# Patient Record
Sex: MALE | Race: WHITE | Employment: FULL TIME | URBAN - NONMETROPOLITAN AREA
[De-identification: names, ages, dates, MRNs, and addresses within clinical notes are randomized per-mention and may not be internally consistent; named-entity substitution may affect disease eponyms.]

---

## 2017-10-08 ENCOUNTER — HOSPITAL ENCOUNTER (EMERGENCY)
Age: 42
Discharge: HOME | End: 2017-10-08
Attending: FAMILY MEDICINE
Payer: COMMERCIAL

## 2017-10-08 VITALS
DIASTOLIC BLOOD PRESSURE: 78 MMHG | RESPIRATION RATE: 20 BRPM | SYSTOLIC BLOOD PRESSURE: 126 MMHG | OXYGEN SATURATION: 99 % | HEART RATE: 63 BPM | TEMPERATURE: 97.9 F

## 2017-10-08 DIAGNOSIS — T63.441A BEE STING, ACCIDENTAL OR UNINTENTIONAL, INITIAL ENCOUNTER: Primary | ICD-10-CM

## 2017-10-08 PROCEDURE — 6360000002 HC RX W HCPCS: Performed by: FAMILY MEDICINE

## 2017-10-08 PROCEDURE — 99282 EMERGENCY DEPT VISIT SF MDM: CPT

## 2017-10-08 PROCEDURE — 96374 THER/PROPH/DIAG INJ IV PUSH: CPT

## 2017-10-08 RX ORDER — DIPHENHYDRAMINE HYDROCHLORIDE 50 MG/ML
25 INJECTION INTRAMUSCULAR; INTRAVENOUS ONCE
Status: COMPLETED | OUTPATIENT
Start: 2017-10-08 | End: 2017-10-08

## 2017-10-08 RX ADMIN — DIPHENHYDRAMINE HYDROCHLORIDE 25 MG: 50 INJECTION, SOLUTION INTRAMUSCULAR; INTRAVENOUS at 18:23

## 2017-10-08 NOTE — ED PROVIDER NOTES
Allergies   Allergen Reactions    Bee Venom Anaphylaxis    Codeine Shortness Of Breath       FAMILY HISTORY    Family History   Problem Relation Age of Onset    Diabetes Mother     Asthma Mother     Cancer Father      Cancer    Heart Disease Father        SOCIAL HISTORY    Social History     Social History    Marital status:      Spouse name: N/A    Number of children: N/A    Years of education: N/A     Social History Main Topics    Smoking status: Former Smoker     Years: 15.00     Quit date: 3/24/2009    Smokeless tobacco: Current User     Types: Chew    Alcohol use 0.6 oz/week     1 Cans of beer per week      Comment: rare    Drug use: No    Sexual activity: Not Asked     Other Topics Concern    None     Social History Narrative    None       REVIEW OF SYSTEMS    Constitutional:  Denies fever, chills, weight loss or weakness   Eyes:  Denies photophobia or discharge   HENT:  Denies sore throat or ear pain   Respiratory:  Denies cough or shortness of breath   Cardiovascular:  Denies chest pain, palpitations or swelling   GI:  Denies abdominal pain, nausea, vomiting, or diarrhea   Musculoskeletal:  Denies back pain   Skin:  Denies rash   Neurologic:  Denies headache, focal weakness or sensory changes   Endocrine:  Denies polyuria or polydypsia   Lymphatic:  Denies swollen glands   Psychiatric:  Denies depression, suicidal ideation or homicidal ideation   All systems negative except as marked. PHYSICAL EXAM    VITAL SIGNS: /78   Pulse 63   Temp 97.9 °F (36.6 °C) (Oral)   Resp 20   SpO2 99%    Constitutional:  Well developed, Well nourished, Mild acute distress, Non-toxic appearance. Has wash cloth on  forehead  HENT:  Normocephalic, Atraumatic, Bilateral external ears normal,  Oropharynx moist, No oral exudates, Nose normal. Neck- Normal range of motion, No tenderness, Supple, No stridor. Eyes:  PERRL, EOMI, Conjunctiva normal, No discharge.    Respiratory:  Normal breath sounds, No respiratory distress, No wheezing, No chest tenderness. Cardiovascular:  Normal heart rate, Normal rhythm, No murmurs, No rubs, No gallops appreciated. [facility] Malady GI:  Bowel sounds normal, Soft, No tenderness, No masses or hepatosplenomegaly, No pulsatile masses. : No CVA tenderness. Musculoskeletal:   No edema, No tenderness, No cyanosis, No clubbing. Good range of motion in all major joints. No tenderness to palpation or major deformities noted. Back- No tenderness to percussion of spine. Integument:  Warm, Dry, No erythema, No rash. Small area of erythema on the DIP of the left ring finger on the dorsal surface. No swelling and no hives seen over the body area. Lymphatic:  No lymphadenopathy noted. Neurologic:  Alert & oriented x 3, Normal motor function, Normal sensory function, No focal deficits noted. Cranial nerves 2-12 wnl and symmetrical.   Psychiatric:  Affect normal, Judgment normal, Mood normal.     EKG        RADIOLOGY    No results found. PROCEDURES        Labs  Labs Reviewed - No data to display          Summation      Patient Course: 49-year-old male who was stung on the ring finger left hand by a bee. He has an ALLERGIC history to bee stings. He has no swelling or hives or rash. He has no wheezing and no pharyngeal swelling. He was treated with IV Benadryl and observed for an hour. He had no orthostatic changes and was able to walk in the schneider. He is discharged home to follow up with primary care and he is given a list of primary care doctors    ED Medications administered this visit:    Medications   diphenhydrAMINE (BENADRYL) injection 25 mg (25 mg Intravenous Given 10/8/17 1823)       New Prescriptions from this visit:    New Prescriptions    No medications on file       Follow-up:  No follow-up provider specified. Final Impression:   1.  Bee sting, accidental or unintentional, initial encounter               (Please note that portions of this note were completed

## 2017-10-09 NOTE — ED NOTES
Pt.'s left hand elevated and ice pack applied to left hand. No visible interruptions in skin integrity, no redness swelling to left hand. CMS intact.      Sandeep Frey RN  10/08/17 2597

## 2018-01-02 ENCOUNTER — HOSPITAL ENCOUNTER (EMERGENCY)
Age: 43
Discharge: HOME | End: 2018-01-03
Attending: FAMILY MEDICINE
Payer: COMMERCIAL

## 2018-01-02 VITALS
RESPIRATION RATE: 16 BRPM | DIASTOLIC BLOOD PRESSURE: 88 MMHG | OXYGEN SATURATION: 98 % | TEMPERATURE: 98.9 F | HEART RATE: 77 BPM | SYSTOLIC BLOOD PRESSURE: 138 MMHG

## 2018-01-02 DIAGNOSIS — K62.5 RECTAL BLEEDING: Primary | ICD-10-CM

## 2018-01-02 PROCEDURE — 99284 EMERGENCY DEPT VISIT MOD MDM: CPT

## 2018-01-02 ASSESSMENT — PAIN DESCRIPTION - PAIN TYPE: TYPE: ACUTE PAIN

## 2018-01-02 ASSESSMENT — PAIN DESCRIPTION - DESCRIPTORS: DESCRIPTORS: DISCOMFORT;PRESSURE

## 2018-01-02 ASSESSMENT — PAIN DESCRIPTION - LOCATION: LOCATION: ABDOMEN

## 2018-01-02 ASSESSMENT — PAIN DESCRIPTION - FREQUENCY: FREQUENCY: INTERMITTENT

## 2018-01-02 ASSESSMENT — PAIN SCALES - GENERAL: PAINLEVEL_OUTOF10: 2

## 2018-01-03 ENCOUNTER — APPOINTMENT (OUTPATIENT)
Dept: CT IMAGING | Age: 43
End: 2018-01-03
Payer: COMMERCIAL

## 2018-01-03 LAB
ABSOLUTE EOS #: 0.1 K/UL (ref 0–0.4)
ABSOLUTE IMMATURE GRANULOCYTE: ABNORMAL K/UL (ref 0–0.3)
ABSOLUTE LYMPH #: 1.8 K/UL (ref 1–4.8)
ABSOLUTE MONO #: 0.6 K/UL (ref 0–1)
ABSOLUTE RETIC #: 0.07 M/UL (ref 0.02–0.12)
ANION GAP SERPL CALCULATED.3IONS-SCNC: 13 MMOL/L (ref 9–17)
BASOPHILS # BLD: 1 % (ref 0–2)
BASOPHILS ABSOLUTE: 0 K/UL (ref 0–0.2)
BUN BLDV-MCNC: 15 MG/DL (ref 6–20)
BUN/CREAT BLD: 18 (ref 9–20)
CALCIUM SERPL-MCNC: 9.4 MG/DL (ref 8.6–10.4)
CHLORIDE BLD-SCNC: 101 MMOL/L (ref 98–107)
CO2: 26 MMOL/L (ref 20–31)
CREAT SERPL-MCNC: 0.82 MG/DL (ref 0.7–1.2)
DIFFERENTIAL TYPE: YES
EOSINOPHILS RELATIVE PERCENT: 2 % (ref 0–5)
GFR AFRICAN AMERICAN: >60 ML/MIN
GFR NON-AFRICAN AMERICAN: >60 ML/MIN
GFR SERPL CREATININE-BSD FRML MDRD: ABNORMAL ML/MIN/{1.73_M2}
GFR SERPL CREATININE-BSD FRML MDRD: ABNORMAL ML/MIN/{1.73_M2}
GLUCOSE BLD-MCNC: 125 MG/DL (ref 70–99)
HCT VFR BLD CALC: 41.7 % (ref 41–53)
HEMOGLOBIN: 14.4 G/DL (ref 13.5–17.5)
IMMATURE GRANULOCYTES: ABNORMAL %
IMMATURE RETIC FRACT: NORMAL %
IRON SATURATION: 18 % (ref 20–55)
IRON: 58 UG/DL (ref 59–158)
LYMPHOCYTES # BLD: 30 % (ref 13–44)
MCH RBC QN AUTO: 30.1 PG (ref 26–34)
MCHC RBC AUTO-ENTMCNC: 34.4 G/DL (ref 31–37)
MCV RBC AUTO: 87.4 FL (ref 80–100)
MONOCYTES # BLD: 10 % (ref 5–9)
PDW BLD-RTO: 14 % (ref 12.1–15.2)
PLATELET # BLD: 236 K/UL (ref 140–450)
PLATELET ESTIMATE: ABNORMAL
PMV BLD AUTO: ABNORMAL FL (ref 6–12)
POTASSIUM SERPL-SCNC: 3.6 MMOL/L (ref 3.7–5.3)
RBC # BLD: 4.77 M/UL (ref 4.5–5.9)
RBC # BLD: ABNORMAL 10*6/UL
RETIC %: 1.5 % (ref 0.5–2)
RETIC HEMOGLOBIN: NORMAL PG (ref 28.2–35.7)
SEG NEUTROPHILS: 57 % (ref 39–75)
SEGMENTED NEUTROPHILS ABSOLUTE COUNT: 3.6 K/UL (ref 2.1–6.5)
SODIUM BLD-SCNC: 140 MMOL/L (ref 135–144)
TOTAL IRON BINDING CAPACITY: 322 UG/DL (ref 250–450)
UNSATURATED IRON BINDING CAPACITY: 264 UG/DL (ref 112–347)
WBC # BLD: 6.2 K/UL (ref 3.5–11)
WBC # BLD: ABNORMAL 10*3/UL

## 2018-01-03 PROCEDURE — 6360000004 HC RX CONTRAST MEDICATION: Performed by: FAMILY MEDICINE

## 2018-01-03 PROCEDURE — 2580000003 HC RX 258: Performed by: FAMILY MEDICINE

## 2018-01-03 PROCEDURE — 36415 COLL VENOUS BLD VENIPUNCTURE: CPT

## 2018-01-03 PROCEDURE — 80048 BASIC METABOLIC PNL TOTAL CA: CPT

## 2018-01-03 PROCEDURE — 85025 COMPLETE CBC W/AUTO DIFF WBC: CPT

## 2018-01-03 PROCEDURE — 74177 CT ABD & PELVIS W/CONTRAST: CPT

## 2018-01-03 PROCEDURE — 83550 IRON BINDING TEST: CPT

## 2018-01-03 PROCEDURE — 85045 AUTOMATED RETICULOCYTE COUNT: CPT

## 2018-01-03 PROCEDURE — 83540 ASSAY OF IRON: CPT

## 2018-01-03 RX ORDER — SODIUM CHLORIDE 9 MG/ML
150 INJECTION, SOLUTION INTRAVENOUS CONTINUOUS
Status: DISCONTINUED | OUTPATIENT
Start: 2018-01-03 | End: 2018-01-03 | Stop reason: HOSPADM

## 2018-01-03 RX ORDER — FERROUS GLUCONATE 324(37.5)
324 TABLET ORAL 2 TIMES DAILY
Qty: 60 TABLET | Refills: 0 | Status: SHIPPED | OUTPATIENT
Start: 2018-01-03 | End: 2019-12-23

## 2018-01-03 RX ADMIN — IOVERSOL 75 ML: 741 INJECTION INTRA-ARTERIAL; INTRAVENOUS at 00:47

## 2018-01-03 RX ADMIN — SODIUM CHLORIDE 150 ML/HR: 9 INJECTION, SOLUTION INTRAVENOUS at 01:02

## 2018-01-03 NOTE — ED PROVIDER NOTES
History     Social History    Marital status:      Spouse name: N/A    Number of children: N/A    Years of education: N/A     Social History Main Topics    Smoking status: Former Smoker     Years: 15.00     Quit date: 3/24/2009    Smokeless tobacco: Current User     Types: Chew    Alcohol use 0.6 oz/week     1 Cans of beer per week      Comment: rare    Drug use: No    Sexual activity: Not Asked     Other Topics Concern    None     Social History Narrative    None       REVIEW OF SYSTEMS    Constitutional:  Denies fever, chills, weight loss or weakness   Eyes:  Denies photophobia or discharge   HENT:  Denies sore throat or ear pain   Respiratory:  Denies cough or shortness of breath   Cardiovascular:  Denies chest pain, palpitations or swelling   GI:  Denies abdominal pain, nausea, vomiting, or diarrhea   Musculoskeletal:  Denies back pain   Skin:  Denies rash   Neurologic:  Denies headache, focal weakness or sensory changes   Endocrine:  Denies polyuria or polydypsia   Lymphatic:  Denies swollen glands   Psychiatric:  Denies depression, suicidal ideation or homicidal ideation   All systems negative except as marked. PHYSICAL EXAM    VITAL SIGNS: /88   Pulse 77   Temp 98.9 °F (37.2 °C) (Oral)   Resp 16   SpO2 98%    Constitutional:  Well developed, Well nourished, No acute distress, Non-toxic appearance. HENT:  Normocephalic, Atraumatic, Bilateral external ears normal, TM's normal, Oropharynx moist, No oral exudates, Nose normal. Neck- Normal range of motion, No tenderness, Supple, No stridor. Eyes:  PERRL, EOMI, Conjunctiva normal, No discharge. Respiratory:  Normal breath sounds, No respiratory distress, No wheezing, No chest tenderness. Cardiovascular:  Normal heart rate, Normal rhythm, No murmurs, No rubs, No gallops appreciated. [facility] Ripper GI:  Bowel sounds normal, Soft,mild generalized tenderness, No masses or hepatosplenomegaly, No pulsatile masses. : No CVA tenderness.

## 2018-01-04 ENCOUNTER — INITIAL CONSULT (OUTPATIENT)
Dept: SURGERY | Age: 43
End: 2018-01-04
Payer: COMMERCIAL

## 2018-01-04 VITALS — HEIGHT: 75 IN | BODY MASS INDEX: 23.85 KG/M2 | WEIGHT: 191.8 LBS | RESPIRATION RATE: 18 BRPM

## 2018-01-04 DIAGNOSIS — K62.5 RECTAL BLEEDING: Primary | ICD-10-CM

## 2018-01-04 DIAGNOSIS — R10.12 LUQ ABDOMINAL PAIN: ICD-10-CM

## 2018-01-04 DIAGNOSIS — Z01.818 PREOP EXAMINATION: ICD-10-CM

## 2018-01-04 PROCEDURE — 99214 OFFICE O/P EST MOD 30 MIN: CPT | Performed by: SURGERY

## 2018-01-04 PROCEDURE — 4004F PT TOBACCO SCREEN RCVD TLK: CPT | Performed by: SURGERY

## 2018-01-04 PROCEDURE — G8484 FLU IMMUNIZE NO ADMIN: HCPCS | Performed by: SURGERY

## 2018-01-04 PROCEDURE — G8420 CALC BMI NORM PARAMETERS: HCPCS | Performed by: SURGERY

## 2018-01-04 PROCEDURE — G8427 DOCREV CUR MEDS BY ELIG CLIN: HCPCS | Performed by: SURGERY

## 2018-01-04 RX ORDER — SODIUM, POTASSIUM,MAG SULFATES 17.5-3.13G
1 SOLUTION, RECONSTITUTED, ORAL ORAL ONCE
Qty: 2 BOTTLE | Refills: 0 | Status: SHIPPED | OUTPATIENT
Start: 2018-01-04 | End: 2018-01-04

## 2018-01-29 ASSESSMENT — ENCOUNTER SYMPTOMS
CONSTIPATION: 1
BLOOD IN STOOL: 1
ABDOMINAL DISTENTION: 1
SORE THROAT: 0
BACK PAIN: 0
TROUBLE SWALLOWING: 0
VOMITING: 0
CHOKING: 0
ABDOMINAL PAIN: 1
COUGH: 0
NAUSEA: 0
SHORTNESS OF BREATH: 1

## 2018-01-29 NOTE — PROGRESS NOTES
Subjective:      Patient ID: Yohana Castellanos is a 38 y.o. male. HPI     Mr Benny Grimaldo returns for reevaluation. He is a 39 yo WM whom I first saw in 2015 for epigastric pain. EGD at that time showed mild duodenitis. He had a previous history of gastritis, duodenal ulcer, and hiatal hernia with esophagitis. Biopsies of GE junction did not show Davis's change. H pylori negative. Upper GI series August 2015 shows reflux to mid esophagus. His GERD symptoms were reasonably controlled with Protonix, but have worsened recently. He quit smoking 2009, but uses chewing tobacco daily. .. On can lasts 2 days. Rare use of EtOH. Denies drugs. No longer taking indomethacin. Limited carbonated beverages and caffeine. Occasionally enjoys spicy foods. Evaluated in [facility] ER Jan 2, 2018 for rectal bleeding and LLQ pain. He was treated and released, Rx for ferrous gluconate. Review of Systems   Constitutional: Positive for fatigue. Negative for activity change, appetite change, chills, fever and unexpected weight change. HENT: Negative for nosebleeds, sneezing, sore throat and trouble swallowing. Eyes: Negative for visual disturbance. Respiratory: Positive for shortness of breath. Negative for cough and choking. Cardiovascular: Negative for chest pain, palpitations and leg swelling. Gastrointestinal: Positive for abdominal distention, abdominal pain, blood in stool and constipation. Negative for nausea and vomiting. Genitourinary: Negative for dysuria, flank pain and hematuria. Musculoskeletal: Positive for arthralgias. Negative for back pain, gait problem and myalgias. Allergic/Immunologic: Negative for immunocompromised state. Neurological: Positive for headaches. Negative for dizziness, seizures, syncope and weakness. Hematological: Does not bruise/bleed easily. Psychiatric/Behavioral: Negative for confusion and sleep disturbance.         Past Medical History:   Diagnosis Date    COPD [facility]ch[facility] obstructive [facility] disease) ([facility] [address]     Former smoker     H/O hiatal hernia     Hypertension     PUD (peptic ulcer disease) 2/17/2014       Past Surgical History:   Procedure Laterality Date    CARDIAC CATHETERIZATION  3/6/14    Dr Azucena Dorantes: 1. Normal left ventricular systolic function, estimated ejectin fraction of 50 to 52%. 2. Normal left ventricular end-diastolic pressure. 3. Normal angiographically appearing epicardial coronary artery disease, incidental note is made of a small myocardial bridging segment within the left anterior descending and distal anterior circulation vessel attenuation.  CHOLECYSTECTOMY      DENTAL SURGERY      ENDOSCOPY, COLON, DIAGNOSTIC  2014 & 7/20/15    HIATAL HERNIA REPAIR  06/09/2016    UPPER GASTROINTESTINAL ENDOSCOPY  03-    Dr. Marti Badillo ENDOSCOPY  10/09/14    mild gastritis and duodenitis, Random biopsies of GE junction    UPPER GASTROINTESTINAL ENDOSCOPY  07-       Family History   Problem Relation Age of Onset    Diabetes Mother     Asthma Mother     Cancer Father      Cancer    Heart Disease Father        Allergies:  See list    Current Outpatient Prescriptions   Medication Sig Dispense Refill    ferrous gluconate 324 (37.5 Fe) MG TABS Take 1 tablet by mouth 2 times daily 60 tablet 0     No current facility-administered medications for this visit.         Social History     Social History    Marital status:      Spouse name: N/A    Number of children: N/A    Years of education: N/A     Social History Main Topics    Smoking status: Former Smoker     Years: 15.00     Quit date: 3/24/2009    Smokeless tobacco: Current User     Types: Chew    Alcohol use 0.6 oz/week     1 Cans of beer per week      Comment: rare    Drug use: No    Sexual activity: Not Asked     Other Topics Concern    None     Social History Narrative    None       Objective:   Physical Exam   Constitutional: He is oriented to person, place,

## 2019-12-23 ENCOUNTER — HOSPITAL ENCOUNTER (EMERGENCY)
Age: 44
Discharge: HOME | End: 2019-12-23
Attending: EMERGENCY MEDICINE

## 2019-12-23 VITALS
OXYGEN SATURATION: 94 % | WEIGHT: 193.9 LBS | HEIGHT: 75 IN | DIASTOLIC BLOOD PRESSURE: 78 MMHG | TEMPERATURE: 100.5 F | SYSTOLIC BLOOD PRESSURE: 129 MMHG | HEART RATE: 102 BPM | BODY MASS INDEX: 24.11 KG/M2 | RESPIRATION RATE: 16 BRPM

## 2019-12-23 DIAGNOSIS — J10.1 INFLUENZA A: Primary | ICD-10-CM

## 2019-12-23 LAB
DIRECT EXAM: ABNORMAL
DIRECT EXAM: ABNORMAL
Lab: ABNORMAL
SPECIMEN DESCRIPTION: ABNORMAL

## 2019-12-23 PROCEDURE — 87804 INFLUENZA ASSAY W/OPTIC: CPT

## 2019-12-23 PROCEDURE — 99283 EMERGENCY DEPT VISIT LOW MDM: CPT

## 2019-12-23 RX ORDER — BROMPHENIRAMINE MALEATE, PSEUDOEPHEDRINE HYDROCHLORIDE, AND DEXTROMETHORPHAN HYDROBROMIDE 2; 30; 10 MG/5ML; MG/5ML; MG/5ML
5 SYRUP ORAL 4 TIMES DAILY PRN
Qty: 100 ML | Refills: 0 | Status: SHIPPED | OUTPATIENT
Start: 2019-12-23 | End: 2019-12-28

## 2019-12-23 RX ORDER — OSELTAMIVIR PHOSPHATE 75 MG/1
75 CAPSULE ORAL 2 TIMES DAILY
Qty: 10 CAPSULE | Refills: 0 | Status: SHIPPED | OUTPATIENT
Start: 2019-12-23 | End: 2019-12-28

## 2019-12-23 ASSESSMENT — PAIN DESCRIPTION - LOCATION: LOCATION: GENERALIZED

## 2019-12-23 ASSESSMENT — PAIN SCALES - GENERAL: PAINLEVEL_OUTOF10: 3

## 2019-12-23 ASSESSMENT — PAIN DESCRIPTION - PAIN TYPE: TYPE: ACUTE PAIN

## 2019-12-23 ASSESSMENT — PAIN DESCRIPTION - DESCRIPTORS: DESCRIPTORS: ACHING

## 2019-12-23 ASSESSMENT — PAIN DESCRIPTION - FREQUENCY: FREQUENCY: CONTINUOUS

## 2020-03-13 ENCOUNTER — HOSPITAL ENCOUNTER (EMERGENCY)
Age: 45
Discharge: HOME | End: 2020-03-13
Attending: EMERGENCY MEDICINE
Payer: COMMERCIAL

## 2020-03-13 ENCOUNTER — APPOINTMENT (OUTPATIENT)
Dept: GENERAL RADIOLOGY | Age: 45
End: 2020-03-13
Payer: COMMERCIAL

## 2020-03-13 VITALS
WEIGHT: 190 LBS | DIASTOLIC BLOOD PRESSURE: 87 MMHG | TEMPERATURE: 97.8 F | HEART RATE: 62 BPM | OXYGEN SATURATION: 100 % | SYSTOLIC BLOOD PRESSURE: 131 MMHG | HEIGHT: 75 IN | BODY MASS INDEX: 23.62 KG/M2 | RESPIRATION RATE: 17 BRPM

## 2020-03-13 LAB
ABSOLUTE EOS #: 0.1 K/UL (ref 0–0.4)
ABSOLUTE IMMATURE GRANULOCYTE: NORMAL K/UL (ref 0–0.3)
ABSOLUTE LYMPH #: 1.5 K/UL (ref 1–4.8)
ABSOLUTE MONO #: 0.5 K/UL (ref 0–1)
ANION GAP SERPL CALCULATED.3IONS-SCNC: 10 MMOL/L (ref 9–17)
BASOPHILS # BLD: 1 % (ref 0–2)
BASOPHILS ABSOLUTE: 0 K/UL (ref 0–0.2)
BUN BLDV-MCNC: 18 MG/DL (ref 6–20)
BUN/CREAT BLD: 15 (ref 9–20)
CALCIUM SERPL-MCNC: 9.9 MG/DL (ref 8.6–10.4)
CHLORIDE BLD-SCNC: 102 MMOL/L (ref 98–107)
CO2: 27 MMOL/L (ref 20–31)
CREAT SERPL-MCNC: 1.22 MG/DL (ref 0.7–1.2)
DIFFERENTIAL TYPE: YES
DIRECT EXAM: NORMAL
DIRECT EXAM: NORMAL
EKG ATRIAL RATE: 71 BPM
EKG P AXIS: 48 DEGREES
EKG P-R INTERVAL: 128 MS
EKG Q-T INTERVAL: 404 MS
EKG QRS DURATION: 104 MS
EKG QTC CALCULATION (BAZETT): 439 MS
EKG R AXIS: 19 DEGREES
EKG T AXIS: 26 DEGREES
EKG VENTRICULAR RATE: 71 BPM
EOSINOPHILS RELATIVE PERCENT: 1 % (ref 0–5)
GFR AFRICAN AMERICAN: >60 ML/MIN
GFR NON-AFRICAN AMERICAN: >60 ML/MIN
GFR SERPL CREATININE-BSD FRML MDRD: ABNORMAL ML/MIN/{1.73_M2}
GFR SERPL CREATININE-BSD FRML MDRD: ABNORMAL ML/MIN/{1.73_M2}
GLUCOSE BLD-MCNC: 112 MG/DL (ref 70–99)
HCT VFR BLD CALC: 41.5 % (ref 41–53)
HEMOGLOBIN: 14.2 G/DL (ref 13.5–17.5)
IMMATURE GRANULOCYTES: NORMAL %
INR BLD: 1
LYMPHOCYTES # BLD: 24 % (ref 13–44)
Lab: NORMAL
MCH RBC QN AUTO: 30.3 PG (ref 26–34)
MCHC RBC AUTO-ENTMCNC: 34.3 G/DL (ref 31–37)
MCV RBC AUTO: 88.4 FL (ref 80–100)
MONOCYTES # BLD: 7 % (ref 5–9)
NRBC AUTOMATED: NORMAL PER 100 WBC
PARTIAL THROMBOPLASTIN TIME: 25.1 SEC (ref 21–33)
PDW BLD-RTO: 14.7 % (ref 12.1–15.2)
PLATELET # BLD: 234 K/UL (ref 140–450)
PLATELET ESTIMATE: NORMAL
PMV BLD AUTO: NORMAL FL (ref 6–12)
POTASSIUM SERPL-SCNC: 3.8 MMOL/L (ref 3.7–5.3)
PROTHROMBIN TIME: 9.7 SEC (ref 9–11.6)
RBC # BLD: 4.69 M/UL (ref 4.5–5.9)
RBC # BLD: NORMAL 10*6/UL
SEG NEUTROPHILS: 67 % (ref 39–75)
SEGMENTED NEUTROPHILS ABSOLUTE COUNT: 4.3 K/UL (ref 2.1–6.5)
SODIUM BLD-SCNC: 139 MMOL/L (ref 135–144)
SPECIMEN DESCRIPTION: NORMAL
TROPONIN INTERP: NORMAL
TROPONIN T: <0.03 NG/ML
TROPONIN, HIGH SENSITIVITY: NORMAL NG/L (ref 0–22)
WBC # BLD: 6.4 K/UL (ref 3.5–11)
WBC # BLD: NORMAL 10*3/UL

## 2020-03-13 PROCEDURE — 85610 PROTHROMBIN TIME: CPT

## 2020-03-13 PROCEDURE — 85025 COMPLETE CBC W/AUTO DIFF WBC: CPT

## 2020-03-13 PROCEDURE — 85730 THROMBOPLASTIN TIME PARTIAL: CPT

## 2020-03-13 PROCEDURE — 87804 INFLUENZA ASSAY W/OPTIC: CPT

## 2020-03-13 PROCEDURE — 93010 ELECTROCARDIOGRAM REPORT: CPT | Performed by: INTERNAL MEDICINE

## 2020-03-13 PROCEDURE — 84484 ASSAY OF TROPONIN QUANT: CPT

## 2020-03-13 PROCEDURE — 80048 BASIC METABOLIC PNL TOTAL CA: CPT

## 2020-03-13 PROCEDURE — 93005 ELECTROCARDIOGRAM TRACING: CPT | Performed by: EMERGENCY MEDICINE

## 2020-03-13 PROCEDURE — 99285 EMERGENCY DEPT VISIT HI MDM: CPT

## 2020-03-13 PROCEDURE — 71045 X-RAY EXAM CHEST 1 VIEW: CPT

## 2020-03-13 RX ORDER — DOXYCYCLINE 100 MG/1
100 CAPSULE ORAL 2 TIMES DAILY
Qty: 20 CAPSULE | Refills: 0 | Status: SHIPPED | OUTPATIENT
Start: 2020-03-13 | End: 2021-02-09 | Stop reason: ALTCHOICE

## 2020-03-13 RX ORDER — PREDNISONE 20 MG/1
40 TABLET ORAL DAILY
Qty: 10 TABLET | Refills: 0 | Status: SHIPPED | OUTPATIENT
Start: 2020-03-13 | End: 2020-03-18

## 2020-03-13 ASSESSMENT — PAIN DESCRIPTION - LOCATION: LOCATION: CHEST

## 2020-03-13 ASSESSMENT — PAIN DESCRIPTION - ONSET: ONSET: ON-GOING

## 2020-03-13 ASSESSMENT — PAIN DESCRIPTION - PAIN TYPE: TYPE: ACUTE PAIN

## 2020-03-13 ASSESSMENT — PAIN DESCRIPTION - DESCRIPTORS: DESCRIPTORS: PRESSURE

## 2020-03-13 ASSESSMENT — PAIN SCALES - GENERAL: PAINLEVEL_OUTOF10: 3

## 2020-03-13 ASSESSMENT — PAIN DESCRIPTION - PROGRESSION: CLINICAL_PROGRESSION: GRADUALLY WORSENING

## 2020-03-13 ASSESSMENT — PAIN DESCRIPTION - FREQUENCY: FREQUENCY: CONTINUOUS

## 2020-03-13 ASSESSMENT — PAIN DESCRIPTION - ORIENTATION: ORIENTATION: MID

## 2020-03-13 NOTE — ED PROVIDER NOTES
eMERGENCY dEPARTMENT eNCOUnter        [facility]    Chief Complaint   Patient presents with    Chest Pain     ongoing for a week       HPI    Ramon Ventura is a 53 y.o. male who presents to ED from home. By car. With complaint of chest pressure. Onset last week. Patient has been having chest pressure and dry cough for 1 week. Patient is has been exposed to somebody with influenza. Complains of chest pressure. Patient has dry cough. Patient quit smoking 12 years ago. Patient has history of coronary disease. Patient denies fever denies chills. REVIEW OF SYSTEMS    All systems reviewed and positives are in the HPI. PAST MEDICAL HISTORY    Past Medical History:   Diagnosis Date    COPD (chronic obstructive pulmonary disease) ([address]     Former smoker     H/O hiatal hernia     Hypertension     PUD (peptic ulcer disease) 2/17/2014       SURGICAL HISTORY    Past Surgical History:   Procedure Laterality Date    CARDIAC CATHETERIZATION  3/6/14    Dr Jenny Jade: 1. Normal left ventricular systolic function, estimated ejectin fraction of 50 to 52%. 2. Normal left ventricular end-diastolic pressure. 3. Normal angiographically appearing epicardial coronary artery disease, incidental note is made of a small myocardial bridging segment within the left anterior descending and distal anterior circulation vessel attenuation.     CHOLECYSTECTOMY      DENTAL SURGERY      ENDOSCOPY, COLON, DIAGNOSTIC  2014 & 7/20/15    HIATAL HERNIA REPAIR  06/09/2016    UPPER GASTROINTESTINAL ENDOSCOPY  03-    Dr. Carrie Ng ENDOSCOPY  10/09/14    mild gastritis and duodenitis, Random biopsies of GE junction    UPPER GASTROINTESTINAL ENDOSCOPY  07-       CURRENT MEDICATIONS        ALLERGIES    Allergies   Allergen Reactions    Bee Venom Anaphylaxis    Codeine Shortness Of Breath       FAMILY HISTORY    Family History   Problem Relation Age of Onset    Diabetes Mother     Asthma nontender, no organomegaly, no mass, no rebound, no guarding   Musculoskeletal:  No edema, no tenderness, no deformities. Back- no tenderness   Integument:  Well hydrated, no rash   Neurologic:  Alert & oriented x 3, no focal deficits noted     EKG    Sinus rhythm rate of 71    RADIOLOGY/PROCEDURES    . wet    Labs  Labs Reviewed   BASIC METABOLIC PANEL - Abnormal; Notable for the following components:       Result Value    Glucose 112 (*)     CREATININE 1.22 (*)     All other components within normal limits   RAPID INFLUENZA A/B ANTIGENS   APTT   CBC WITH AUTO DIFFERENTIAL   PROTIME-INR   TROPONIN           Summation      Patient Course: Patient  has dry cough. Labs with no acute findings. Patient will be sent home. The warning signs were discussed. Work for 3 days. Return to ED if worse. ED Medications administered this visit:  Medications - No data to display    New Prescriptions from this visit:    New Prescriptions    DOXYCYCLINE MONOHYDRATE (MONODOX) 100 MG CAPSULE    Take 1 capsule by mouth 2 times daily    PREDNISONE (DELTASONE) 20 MG TABLET    Take 2 tablets by mouth daily for 5 doses       Follow-up:  No follow-up provider specified. Final Impression:   1. Chest pressure    2.  Acute bronchitis, unspecified organism               (Please note that portions of this note were completed with a voice recognition program.  Efforts were made to edit the dictations but occasionally words are mis-transcribed.)       Danielle Zhang MD  03/13/20 Maicol Merritt MD  03/14/20 0523

## 2021-02-09 ENCOUNTER — APPOINTMENT (OUTPATIENT)
Dept: GENERAL RADIOLOGY | Age: 46
End: 2021-02-09
Payer: COMMERCIAL

## 2021-02-09 ENCOUNTER — HOSPITAL ENCOUNTER (EMERGENCY)
Age: 46
Discharge: HOME | End: 2021-02-09
Attending: FAMILY MEDICINE
Payer: COMMERCIAL

## 2021-02-09 VITALS
SYSTOLIC BLOOD PRESSURE: 141 MMHG | WEIGHT: 195 LBS | DIASTOLIC BLOOD PRESSURE: 95 MMHG | OXYGEN SATURATION: 100 % | HEIGHT: 75 IN | RESPIRATION RATE: 18 BRPM | BODY MASS INDEX: 24.25 KG/M2 | TEMPERATURE: 98.8 F | HEART RATE: 75 BPM

## 2021-02-09 DIAGNOSIS — S69.91XA INJURY OF RIGHT WRIST, INITIAL ENCOUNTER: Primary | ICD-10-CM

## 2021-02-09 DIAGNOSIS — M12.511: ICD-10-CM
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
## 2021-02-09 PROCEDURE — 73130 X-RAY EXAM OF HAND: CPT

## 2021-02-09 PROCEDURE — 73030 X-RAY EXAM OF SHOULDER: CPT

## 2021-02-09 PROCEDURE — 99282 EMERGENCY DEPT VISIT SF MDM: CPT

## 2021-02-09 PROCEDURE — 73110 X-RAY EXAM OF WRIST: CPT

## 2021-02-09 ASSESSMENT — PAIN SCALES - GENERAL: PAINLEVEL_OUTOF10: 5

## 2021-02-09 NOTE — ED PROVIDER NOTES
[address]  eMERGENCY dEPARTMENT eNCOUnter          [address] [facility]       Chief Complaint   Patient presents with    Hand Injury     dog took off and pt slipped on ice landing o his right hand and wrist        Nurses Notes reviewed and I agree except as noted in the HPI. HISTORY OF PRESENT ILLNESS    Pamela Dhillon is a 50 y.o. male who presents to the emergency room via private vehicle, patient states that he was walking his large dog when the dog took off, causing him to slip on the ice, landing on his right wrist and arm, resulting pain in his right wrist and right shoulder area. Patient denies striking his head denies loss of consciousness. Pain worse with some movements. Patient rates the pain 5 out of 10. Denies other injury    REVIEW OF SYSTEMS     Review of Systems   All other systems reviewed and are negative. PAST MEDICAL HISTORY    has a past medical history of COPD (chronic obstructive pulmonary disease) ([facility] [address] Former smoker, H/O hiatal hernia, Hypertension, and PUD (peptic ulcer disease). SURGICAL HISTORY      has a past surgical history that includes Cholecystectomy; Upper gastrointestinal endoscopy (2014); Cardiac catheterization (3/6/14); Upper gastrointestinal endoscopy (10/09/14); Dental surgery; Endoscopy, colon, diagnostic ( & 7/20/15); Upper gastrointestinal endoscopy (2015); and hiatal hernia repair (2016). CURRENT MEDICATIONS       Previous Medications    No medications on file       ALLERGIES     is allergic to bee venom and codeine. FAMILY HISTORY     He indicated that his mother is alive. He indicated that his father is . He indicated that his sister is alive. He indicated that his brother is alive. He indicated that his maternal grandmother is . He indicated that his maternal grandfather is . He indicated that his paternal grandmother is .  He indicated that his paternal grandfather is . He indicated that his daughter is alive. He indicated that all of his three sons are alive. family history includes Asthma in his mother; Cancer in his father; Diabetes in his mother; Heart Disease in his father. SOCIAL HISTORY      reports that he quit smoking about 11 years ago. He quit after 15.00 years of use. His smokeless tobacco use includes chew. He reports current alcohol use of about 1.0 standard drinks of alcohol per week. He reports that he does not use drugs. PHYSICAL EXAM     INITIAL VITALS:  height is 6' 3\" (1.905 m) and weight is 195 lb (88.5 kg). His temperature is 98.8 °F (37.1 °C). His blood pressure is 141/95 (abnormal) and his pulse is 75. His respiration is 18 and oxygen saturation is 100%. Physical Exam   Constitutional: Patient is oriented to person, place, and time. Patient appears well-developed and well-nourished. Patient is active and cooperative. HENT:   Head: Normocephalic and atraumatic. Head is without contusion. Right Ear: Hearing and external ear normal. No drainage. Left Ear: Hearing and external ear normal. No drainage. Nose: Nose normal. No nasal deformity. No epistaxis. Mouth/Throat: Mucous membranes are not dry. Eyes: EOMI. Conjunctivae, sclera, and lids are normal. Right eye exhibits no discharge. Left eye exhibits no discharge. Neck: Full passive range of motion without pain and phonation normal.   Cardiovascular:  Normal rate, regular rhythm and intact distal pulses. Pulses: Right radial pulse  2+   Pulmonary/Chest: Effort normal. No tachypnea and no bradypnea. Abdominal: .  Patient without distension   Musculoskeletal:   Focused examination of patient's right upper extremity shows no gross trauma or deformity, there is noted snuffbox tenderness, with some pain to the patient along the mid wrist both volar and dorsally, no overlying integument aberration, distal CSM intact, no pain to palpation of the metacarpals or phalanges. Focused examination of right shoulder shows no gross trauma deformity, patient with some discomfort with palpation along the right clavicle and right ACM joint, patient was some pain with active abduction and flexion, less so with passive abduction flexion, and minimal pain with extension at the shoulder. No crepitus, no overlying integument aberration. Except as otherwise noted, negative acute trauma or deformity,  apparent full range of motion and normal strength all extremities appropriate to age. Neurological: Patient is alert and oriented to person, place, and time. patient displays no tremor. Patient displays no seizure activity. Normal observed gait  Skin: Skin is warm and dry. Patient is not diaphoretic. Psychiatric: Patient has a normal mood and affect. Patient speech is normal and behavior is normal. Cognition and memory are normal.    DIFFERENTIAL DIAGNOSIS:   Fracture sprain strain dislocation contusion, traumatic bursitis    DIAGNOSTIC RESULTS           RADIOLOGY: non-plain film images(s) such as CT, Ultrasound and MRI are read by the radiologist.  XR HAND RIGHT (MIN 3 VIEWS)   Final Result      Negative right hand and right wrist.         XR WRIST RIGHT (MIN 3 VIEWS)   Final Result      Negative right hand and right wrist.         XR SHOULDER RIGHT (MIN 2 VIEWS)   Final Result      Negative. LABS:   Labs Reviewed - No data to display    EMERGENCY DEPARTMENT COURSE:   Vitals:    Vitals:    02/09/21 1653   BP: (!) 141/95   Pulse: 75   Resp: 18   Temp: 98.8 °F (37.1 °C)   SpO2: 100%   Weight: 195 lb (88.5 kg)   Height: 6' 3\" (1.905 m)       Patient history and physical exam taken at bedside, discussed patient symptoms and exam findings, discussed getting x-rays of the right shoulder wrist and hand.   Patient sitting on bed upright position of comfort, acknowledges    OARRS = 0    Radiology report reviewed    Discussed the patient imaging findings including radiology reports, I did reexamine the shoulder joint, at this time felt to be more traumatic bursitis, would advise patient to not use a sling, ice/heat as desired, Motrin/Tylenol for pain. We did discuss patient's wrist, although there is no fracture noted, I have concern is patient has snuffbox tenderness, will place patient in thumb spica in the interim, with follow-up with orthopedic surgery. Is is 3 is until outpatient orthopedic clinic, will give patient the orthopedic surgery contact phone number to call, and determine if he can be seen in 3 days versus 10 days. Patient acknowledges    FINAL IMPRESSION      1. Injury of right wrist, initial encounter    2. Traumatic arthropathy of shoulder, right    3. Accidental fall, initial encounter          DISPOSITION/PLAN   D/c    PATIENT REFERRED TO:  Iqra Amor MD  [address] [place], [place] [address]ronaldo [place] [address]    Call       [address] [facility]  [address] [id number] [phone]  Call   As needed    [facility] ED  [address] [address]  [phone]    As needed, If symptoms worsen      DISCHARGE MEDICATIONS:  New Prescriptions    No medications on file           Summation      Patient Course: d/c    ED Medications administered this visit:  Medications - No data to display    New Prescriptions from this visit:    New Prescriptions    No medications on file       Follow-up:  Iqra Amor MD  [address] [place] [address] [address] [facility]    Call       [address]  [address] [id number] [phone]  Call   As needed    [facility]  [address] [address]  [phone]    As needed, If symptoms worsen        Final Impression:   1. Injury of right wrist, initial encounter    2. Traumatic arthropathy of shoulder, right    3.  Accidental fall, initial encounter               (Please note that portions of this note were completed with a voice recognition program.  Efforts were made to edit the dictations but occasionally words are mis-transcribed.)    MD Julia Dominguez MD  02/09/21 9736

## 2021-02-09 NOTE — LETTER
[facility] ED  [address]  Phone: [phone]               February 9, 2021    Patient: Joseph Ricketts   YOB: 1975   Date of Visit: 2/9/2021       To Whom It May Concern:    Coleen Nguyen was seen and treated in our emergency department on 2/9/2021. He may return to work on 2/13/2021.        Sincerely,       Richard Maldonado RN         Signature:__________________________________

## 2021-02-19 ENCOUNTER — HOSPITAL ENCOUNTER (OUTPATIENT)
Dept: GENERAL RADIOLOGY | Age: 46
Discharge: HOME | End: 2021-02-21
Payer: COMMERCIAL

## 2021-02-19 ENCOUNTER — HOSPITAL ENCOUNTER (OUTPATIENT)
Age: 46
Discharge: HOME | End: 2021-02-21
Payer: COMMERCIAL

## 2021-02-19 DIAGNOSIS — M79.641 HAND PAIN, RIGHT: ICD-10-CM

## 2021-02-19 PROCEDURE — 73120 X-RAY EXAM OF HAND: CPT

## 2021-03-05 ENCOUNTER — HOSPITAL ENCOUNTER (OUTPATIENT)
Dept: GENERAL RADIOLOGY | Age: 46
Discharge: HOME | End: 2021-03-07
Payer: COMMERCIAL

## 2021-03-05 ENCOUNTER — HOSPITAL ENCOUNTER (OUTPATIENT)
Age: 46
Discharge: HOME | End: 2021-03-07
Payer: COMMERCIAL

## 2021-03-05 DIAGNOSIS — S69.91XA INJURY OF RIGHT HAND, INITIAL ENCOUNTER: ICD-10-CM

## 2021-03-05 PROCEDURE — 73120 X-RAY EXAM OF HAND: CPT

## 2021-04-12 ENCOUNTER — APPOINTMENT (OUTPATIENT)
Dept: MRI IMAGING | Age: 46
End: 2021-04-12
Payer: COMMERCIAL

## 2021-04-12 ENCOUNTER — APPOINTMENT (OUTPATIENT)
Dept: CT IMAGING | Age: 46
End: 2021-04-12
Payer: COMMERCIAL

## 2021-04-12 ENCOUNTER — HOSPITAL ENCOUNTER (EMERGENCY)
Age: 46
Discharge: HOME | End: 2021-04-12
Attending: FAMILY MEDICINE
Payer: COMMERCIAL

## 2021-04-12 VITALS
BODY MASS INDEX: 24.25 KG/M2 | SYSTOLIC BLOOD PRESSURE: 126 MMHG | OXYGEN SATURATION: 100 % | HEIGHT: 75 IN | DIASTOLIC BLOOD PRESSURE: 88 MMHG | HEART RATE: 78 BPM | WEIGHT: 195 LBS | RESPIRATION RATE: 16 BRPM | TEMPERATURE: 98.1 F

## 2021-04-12 DIAGNOSIS — M54.12 CERVICAL RADICULOPATHY: ICD-10-CM

## 2021-04-12 DIAGNOSIS — M54.2 NECK PAIN: ICD-10-CM

## 2021-04-12 DIAGNOSIS — S06.0X9A CONCUSSION WITH LOSS OF CONSCIOUSNESS, INITIAL ENCOUNTER: Primary | ICD-10-CM

## 2021-04-12 PROCEDURE — 70450 CT HEAD/BRAIN W/O DYE: CPT

## 2021-04-12 PROCEDURE — 72141 MRI NECK SPINE W/O DYE: CPT

## 2021-04-12 PROCEDURE — 99283 EMERGENCY DEPT VISIT LOW MDM: CPT

## 2021-04-12 PROCEDURE — 72125 CT NECK SPINE W/O DYE: CPT

## 2021-04-12 RX ORDER — PREDNISONE 20 MG/1
60 TABLET ORAL DAILY
Qty: 15 TABLET | Refills: 0 | Status: SHIPPED | OUTPATIENT
Start: 2021-04-12 | End: 2021-04-17

## 2021-04-12 ASSESSMENT — PAIN DESCRIPTION - FREQUENCY: FREQUENCY: CONTINUOUS

## 2021-04-12 ASSESSMENT — PAIN DESCRIPTION - ORIENTATION: ORIENTATION: RIGHT

## 2021-04-12 ASSESSMENT — ENCOUNTER SYMPTOMS
PHOTOPHOBIA: 1
NAUSEA: 0

## 2021-04-12 ASSESSMENT — PAIN DESCRIPTION - DESCRIPTORS: DESCRIPTORS: ACHING;CRUSHING

## 2021-04-12 ASSESSMENT — PAIN SCALES - GENERAL: PAINLEVEL_OUTOF10: 9

## 2021-04-12 NOTE — PROGRESS NOTES
Pts supervisor, Mayra Gambino who transferred patient to ED, states he does not need a drug or alcohol screen for workman's compensation.
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
## 2021-04-14 ENCOUNTER — HOSPITAL ENCOUNTER (OUTPATIENT)
Dept: GENERAL RADIOLOGY | Age: 46
Discharge: HOME | End: 2021-04-16
Payer: COMMERCIAL

## 2021-04-14 ENCOUNTER — HOSPITAL ENCOUNTER (OUTPATIENT)
Age: 46
Discharge: HOME | End: 2021-04-16
Payer: COMMERCIAL

## 2021-04-14 DIAGNOSIS — M25.511 ACUTE PAIN OF RIGHT SHOULDER: ICD-10-CM

## 2021-04-14 PROCEDURE — 73030 X-RAY EXAM OF SHOULDER: CPT

## 2021-04-15 ENCOUNTER — HOSPITAL ENCOUNTER (OUTPATIENT)
Dept: PHYSICAL THERAPY | Age: 46
Setting detail: THERAPIES SERIES
Discharge: HOME | End: 2021-04-15
Payer: COMMERCIAL

## 2021-04-15 PROCEDURE — 97162 PT EVAL MOD COMPLEX 30 MIN: CPT

## 2021-04-15 PROCEDURE — G0283 ELEC STIM OTHER THAN WOUND: HCPCS

## 2021-04-15 ASSESSMENT — PAIN DESCRIPTION - LOCATION: LOCATION: ARM;HEAD;NECK;SHOULDER
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
## 2021-04-19 ENCOUNTER — HOSPITAL ENCOUNTER (OUTPATIENT)
Dept: PHYSICAL THERAPY | Age: 46
Setting detail: THERAPIES SERIES
Discharge: HOME | End: 2021-04-19
Payer: COMMERCIAL

## 2021-04-19 PROCEDURE — G0283 ELEC STIM OTHER THAN WOUND: HCPCS

## 2021-04-19 PROCEDURE — 97110 THERAPEUTIC EXERCISES: CPT

## 2021-04-19 PROCEDURE — 97140 MANUAL THERAPY 1/> REGIONS: CPT

## 2021-04-19 NOTE — PROGRESS NOTES
Phone: Gillian Vasquez      Fax: [phone]                            Outpatient Physical Therapy                                                                            Daily Note    Date: 2021  Patient Name: Anibal Michelle        MRN: [id number]   ACCT#:  [de-identified]  : 1975  (50 y.o.)    Referring Practitioner: SHWETA Garza    Referral Date : 21    Diagnosis: Concussion, Cervical region radiculopathy, sprain of ligaments of cervical spine, contusion of right shld  Treatment Diagnosis: Neck Pain    Onset Date: 21  PT Insurance Information: [address] [place] [address]  Total # of Visits Approved: 12 Per Physician Order  Total # of Visits to Date: 2    Pre-Treatment Pain:  310     Assessment  Assessment: Patient reports pain prior to session as 3/10. Progressed with exercises as outlined above. Does note \"popping\" while completing doorway pec stretch. Manual therapy to neck and R shoulder followed by IFES and cold back to B cervical.  Chart Reviewed: Yes    Plan  Plan: Plan of care initiated    Exercises/Modalities/Manual:  See DocFlow Sheet    Education:           Goals  (Total # of Visits to Date: 2)   Short Term Goals - Time Frame for Short term goals: 8  Short term goal 1: Patient to be independent with HEP  Short term goal 2: Increase ROM cervical rotation to 55 degrees bilaterally to turn head when driving   Short term goal 3:  Increase strength R shld flexion 4+/5 to lift overhead          Long Term Goals - Time Frame for Long term goals : 12  Long term goal 1: Be rid of radicular pain into R arm x3 days  Long term goal 2: Decrease neck and R shld pain 2/10 at worst without meds x 3days  Long term goal 3: Improve functional activities with Neck Disability index <7/50          Post Treatment Pain:  2-3/10    Time In: 1113   Time Out : 1204        Timed Code Treatment Minutes: 37 Minutes(IFES x14 min)  Total Treatment Time: [address] Li ,PTA    Date: 4/19/2021

## 2021-04-21 ENCOUNTER — HOSPITAL ENCOUNTER (OUTPATIENT)
Dept: PHYSICAL THERAPY | Age: 46
Setting detail: THERAPIES SERIES
Discharge: HOME | End: 2021-04-21
Payer: COMMERCIAL

## 2021-04-21 PROCEDURE — 97140 MANUAL THERAPY 1/> REGIONS: CPT

## 2021-04-21 PROCEDURE — G0283 ELEC STIM OTHER THAN WOUND: HCPCS

## 2021-04-21 PROCEDURE — 97110 THERAPEUTIC EXERCISES: CPT

## 2021-04-23 ENCOUNTER — HOSPITAL ENCOUNTER (OUTPATIENT)
Dept: PHYSICAL THERAPY | Age: 46
Setting detail: THERAPIES SERIES
Discharge: HOME | End: 2021-04-23
Payer: COMMERCIAL

## 2021-04-23 PROCEDURE — 97110 THERAPEUTIC EXERCISES: CPT

## 2021-04-23 PROCEDURE — 97140 MANUAL THERAPY 1/> REGIONS: CPT

## 2021-04-23 PROCEDURE — G0283 ELEC STIM OTHER THAN WOUND: HCPCS

## 2021-04-23 ASSESSMENT — PAIN SCALES - GENERAL: PAINLEVEL_OUTOF10: 3

## 2021-04-23 NOTE — PROGRESS NOTES
Timed Code Treatment Minutes: 32 Minutes(IFES x14 min)  Total Treatment Time: 1623 Old Justin, PT     Date: 4/23/2021

## 2021-04-27 ENCOUNTER — HOSPITAL ENCOUNTER (OUTPATIENT)
Dept: PHYSICAL THERAPY | Age: 46
Setting detail: THERAPIES SERIES
Discharge: HOME | End: 2021-04-27
Payer: COMMERCIAL

## 2021-04-27 PROCEDURE — 97140 MANUAL THERAPY 1/> REGIONS: CPT

## 2021-04-27 PROCEDURE — 97110 THERAPEUTIC EXERCISES: CPT

## 2021-04-27 PROCEDURE — 97012 MECHANICAL TRACTION THERAPY: CPT

## 2021-04-27 ASSESSMENT — PAIN SCALES - GENERAL: PAINLEVEL_OUTOF10: 2

## 2021-04-27 ASSESSMENT — PAIN DESCRIPTION - LOCATION: LOCATION: ARM;NECK

## 2021-04-27 NOTE — PROGRESS NOTES
2/10    Time In: 10:52    Time Out : 11:37   Therex: 10:52- 11:07  Manual: 11:07-11:20  Mechanical cervical traction 11:21-11:37     Timed Code Treatment Minutes: 30 Minutes  Total Treatment Time: 1125 Felipa Payne, ROCÍO     Date: 4/27/2021

## 2021-04-28 ENCOUNTER — HOSPITAL ENCOUNTER (OUTPATIENT)
Dept: PHYSICAL THERAPY | Age: 46
Setting detail: THERAPIES SERIES
Discharge: HOME | End: 2021-04-28
Payer: COMMERCIAL

## 2021-04-28 PROCEDURE — 97110 THERAPEUTIC EXERCISES: CPT

## 2021-04-28 PROCEDURE — 97140 MANUAL THERAPY 1/> REGIONS: CPT

## 2021-04-28 PROCEDURE — 97012 MECHANICAL TRACTION THERAPY: CPT

## 2021-04-28 NOTE — PROGRESS NOTES
Phone: Gillian Vasquez      Fax: [phone]                            Outpatient Physical Therapy                                                                            Daily Note    Date: 2021  Patient Name: Paddy Baxter        MRN: [id number]   ACCT#:  [de-identified]  : 1975  (50 y.o.)    Referring Practitioner: SHWETA Sofia    Referral Date : 21    Diagnosis: Concussion, Cervical region radiculopathy, sprain of ligaments of cervical spine, contusion of right shld  Treatment Diagnosis: Neck Pain    Onset Date: 21  PT Insurance Information: [facility]  Total # of Visits Approved: 12 Per Physician Order  Total # of Visits to Date: 6    Pre-Treatment Pain:  3/10     Assessment  Assessment: Patient rates pain prior to session as 3/10. Reports some relief from cervical traction. Continued with exercises as outlined above followd by manual and intermittent cervical traction. Chart Reviewed: Yes    Plan  Plan: Continue with current plan    Exercises/Modalities/Manual:  See DocFlow Sheet    Education:           Goals  (Total # of Visits to Date: 6)   Short Term Goals - Time Frame for Short term goals: 8  Short term goal 1: Patient to be independent with HEP-Met  Short term goal 2: Increase ROM cervical rotation to 55 degrees bilaterally to turn head when driving -Met  Short term goal 3:  Increase strength R shld flexion 4+/5 to lift overhead          Long Term Goals - Time Frame for Long term goals : 12  Long term goal 1: Be rid of radicular pain into R arm x3 days  Long term goal 2: Decrease neck and R shld pain 2/10 at worst without meds x 3days  Long term goal 3: Improve functional activities with Neck Disability index <7/50          Post Treatment Pain:  3/10    Time In: 1107  Time Out : 1200        Timed Code Treatment Minutes: 38 Minutes  Total Treatment Time: 53 Minutes    Caitlin Kapoor     Date: 2021

## 2021-04-30 ENCOUNTER — HOSPITAL ENCOUNTER (OUTPATIENT)
Dept: PHYSICAL THERAPY | Age: 46
Setting detail: THERAPIES SERIES
Discharge: HOME | End: 2021-04-30
Payer: COMMERCIAL

## 2021-04-30 PROCEDURE — 97110 THERAPEUTIC EXERCISES: CPT

## 2021-04-30 PROCEDURE — 97012 MECHANICAL TRACTION THERAPY: CPT

## 2021-04-30 PROCEDURE — 97140 MANUAL THERAPY 1/> REGIONS: CPT

## 2021-04-30 ASSESSMENT — PAIN SCALES - GENERAL: PAINLEVEL_OUTOF10: 2

## 2021-04-30 NOTE — PROGRESS NOTES
Phone: Gillian Vasquez      Fax: [phone]                            Outpatient Physical Therapy                                                                            Daily Note    Date: 2021  Patient Name: Viola Izquierdo        MRN: [id number]   ACCT#:  [de-identified]  : 1975  (50 y.o.)    Referring Practitioner: SHWETA Duran    Referral Date : 21    Diagnosis: Concussion, Cervical region radiculopathy, sprain of ligaments of cervical spine, contusion of right shld  Treatment Diagnosis: Neck Pain    Onset Date: 21  PT Insurance Information: [facility]  Total # of Visits Approved: 12 Per Physician Order  Total # of Visits to Date: 7    Pre-Treatment Pain: 2-3/10     Assessment  Assessment: Patient rates pain today as 2-3/10. Continued to progress with exercises as outlined above with fair tolerance. Notes increase in pain with tband exercises today. Concluded session with intermittent cervical traction @18# pull. R shoulder flexion strength 4/5. Chart Reviewed: Yes    Plan  Plan: Continue with current plan    Exercises/Modalities/Manual:  See DocFlow Sheet    Education:           Goals  (Total # of Visits to Date: 7)   Short Term Goals - Time Frame for Short term goals: 8  Short term goal 1: Patient to be independent with HEP-Met  Short term goal 2: Increase ROM cervical rotation to 55 degrees bilaterally to turn head when driving -Met  Short term goal 3:  Increase strength R shld flexion 4+/5 to lift overhead-NOT MET          Long Term Goals - Time Frame for Long term goals : 12  Long term goal 1: Be rid of radicular pain into R arm x3 days  Long term goal 2: Decrease neck and R shld pain 2/10 at worst without meds x 3days  Long term goal 3: Improve functional activities with Neck Disability index <7/50          Post Treatment Pain:  5/10    Time In: 1023  Time Out : 1115        Timed Code Treatment Minutes: 37 Minutes  Total Treatment Time: 52 Brad Beauchamp     Date: 4/30/2021

## 2021-05-03 ENCOUNTER — HOSPITAL ENCOUNTER (OUTPATIENT)
Dept: PHYSICAL THERAPY | Age: 46
Setting detail: THERAPIES SERIES
Discharge: HOME | End: 2021-05-03
Payer: COMMERCIAL

## 2021-05-03 PROCEDURE — 97012 MECHANICAL TRACTION THERAPY: CPT

## 2021-05-03 PROCEDURE — 97110 THERAPEUTIC EXERCISES: CPT

## 2021-05-03 PROCEDURE — 97140 MANUAL THERAPY 1/> REGIONS: CPT

## 2021-05-03 ASSESSMENT — PAIN SCALES - GENERAL: PAINLEVEL_OUTOF10: 2

## 2021-05-05 ENCOUNTER — HOSPITAL ENCOUNTER (OUTPATIENT)
Dept: PHYSICAL THERAPY | Age: 46
Setting detail: THERAPIES SERIES
Discharge: HOME | End: 2021-05-05
Payer: COMMERCIAL

## 2021-05-05 PROCEDURE — G0283 ELEC STIM OTHER THAN WOUND: HCPCS

## 2021-05-05 PROCEDURE — 97140 MANUAL THERAPY 1/> REGIONS: CPT

## 2021-05-05 PROCEDURE — 97110 THERAPEUTIC EXERCISES: CPT

## 2021-05-05 NOTE — PROGRESS NOTES
Phone: [phone]      Fax: [phone]                            Outpatient Physical Therapy                                                                            Daily Note    Date: 2021  Patient Name: Kalia Oconnor        MRN: [id number]   ACCT#:  [de-identified]  : 1975  (50 y.o.)    Referring Practitioner: SHWETA Padilla    Referral Date : 21    Diagnosis: Concussion, Cervical region radiculopathy, sprain of ligaments of cervical spine, contusion of right shld  Treatment Diagnosis: Neck Pain    Onset Date: 21  PT Insurance Information: [facility]  Total # of Visits Approved: 12 Per Physician Order  Total # of Visits to Date: 9    Pre-Treatment Pain:  2/10     Assessment  Assessment: Continues to have a headache today and states it's trying to turn into a migraine. Rates pain in his shoulder as 2/10. Continued with exercises as outlined above with good tolerance. Manual and cervical traction to conclued session. Chart Reviewed: Yes    Plan  Plan: Continue with current plan    Exercises/Modalities/Manual:  See DocFlow Sheet    Education:           Goals  (Total # of Visits to Date: [facility])   Short Term Goals - Time Frame for Short term goals: 8  Short term goal 1: Patient to be independent with HEP-Met  Short term goal 2: Increase ROM cervical rotation to 55 degrees bilaterally to turn head when driving -Met  Short term goal 3:  Increase strength R shld flexion 4+/5 to lift overhead-NOT MET          Long Term Goals - Time Frame for Long term goals : 12  Long term goal 1: Be rid of radicular pain into R arm x3 days  Long term goal 2: Decrease neck and R shld pain 2/10 at worst without meds x 3days  Long term goal 3: Improve functional activities with Neck Disability index <7/50          Post Treatment Pain: 3-4 /10    Time In: 1025  Time Out : 1120        Timed Code Treatment Minutes: 28 Minutes  Total Treatment Time: Alecia

## 2021-05-11 ENCOUNTER — APPOINTMENT (OUTPATIENT)
Dept: PHYSICAL THERAPY | Age: 46
End: 2021-05-11
Payer: COMMERCIAL

## 2021-05-12 ENCOUNTER — HOSPITAL ENCOUNTER (OUTPATIENT)
Dept: PHYSICAL THERAPY | Age: 46
Setting detail: THERAPIES SERIES
Discharge: HOME | End: 2021-05-12
Payer: COMMERCIAL

## 2021-05-12 PROCEDURE — 97110 THERAPEUTIC EXERCISES: CPT

## 2021-05-12 PROCEDURE — 97140 MANUAL THERAPY 1/> REGIONS: CPT

## 2021-05-12 ASSESSMENT — PAIN SCALES - GENERAL: PAINLEVEL_OUTOF10: 2

## 2021-05-12 NOTE — PROGRESS NOTES
Phone: Gillian Vasquez      Fax: [phone]                            Outpatient Physical Therapy                                                                            Daily Note    Date: 2021  Patient Name: Jovany Rankin        MRN: [id number]   ACCT#:  [de-identified]  : 1975  (50 y.o.)    Referring Practitioner: SHWETA Alvarez    Referral Date : 21    Diagnosis: Concussion, Cervical region radiculopathy, sprain of ligaments of cervical spine, contusion of right shld  Treatment Diagnosis: Neck Pain    Onset Date: 21  PT Insurance Information: [facility]  Total # of Visits Approved: 12 Per Physician Order  Total # of Visits to Date: 10  No Show: 1    Pre-Treatment Pain:  2-3/10     Assessment  Assessment: Patient reports having a bad migraine on Friday and this is why he missed his appointment. Rates pain today as 2-3/10. Primarily in R shoulder. Able to complete exercises as outlined above with good tolerance. Held cervical traction today and focused on maual.  Following, patient states he is able to move his R UE around more and it feels less stiff but no change in pain. Continue x 1 visit. Chart Reviewed: Yes    Plan  Plan: Continue with current plan    Exercises/Modalities/Manual:  See DocFlow Sheet    Education:           Goals  (Total # of Visits to Date: 10)   Short Term Goals - Time Frame for Short term goals: 8  Short term goal 1: Patient to be independent with HEP-Met  Short term goal 2: Increase ROM cervical rotation to 55 degrees bilaterally to turn head when driving -Met  Short term goal 3:  Increase strength R shld flexion 4+/5 to lift overhead-NOT MET          Long Term Goals - Time Frame for Long term goals : 12  Long term goal 1: Be rid of radicular pain into R arm x3 days  Long term goal 2: Decrease neck and R shld pain 2/10 at worst without meds x 3days  Long term goal 3: Improve functional activities with Neck Disability index

## 2021-05-14 ENCOUNTER — HOSPITAL ENCOUNTER (OUTPATIENT)
Dept: PHYSICAL THERAPY | Age: 46
Setting detail: THERAPIES SERIES
Discharge: HOME | End: 2021-05-14
Payer: COMMERCIAL

## 2021-05-14 PROCEDURE — 97012 MECHANICAL TRACTION THERAPY: CPT

## 2021-05-14 PROCEDURE — 97110 THERAPEUTIC EXERCISES: CPT

## 2021-05-14 ASSESSMENT — PAIN DESCRIPTION - LOCATION: LOCATION: ARM;NECK

## 2021-05-14 NOTE — DISCHARGE SUMMARY
Phone: [address]             Fax: [phone]                            Outpatient Physical Therapy                                                                    Discharge Summary    Patient: Michael Wick  : 1975  ACCT #: [de-identified]   Referring Practitioner: SHWETA Gomez      Diagnosis: Concussion, Cervical region radiculopathy, sprain of ligaments of cervical spine, contusion of right shld  Date Treatment Initiated: 4/15/21  Date of Last Treatment: 21    PT Visit Information  Onset Date: 21  PT Insurance Information: [facility]  Total # of Visits Approved: 12  Total # of Visits to Date: 6  Cancel / No Show: 1    Frequency/Duration  Days: 3 times per week  Weeks: 4 weeks    Treatment Received  Patient Education/HEP, Therapeutic Exercise, Manual Therapy: Myofacial Release/Cupping, Manual Therapy: Mobilization/Manipulation and Traction    Pain Level: 3    Assessment  Assessment: Pain 2-3/10 in R neck, shld and upper arm. Completed therex per Doc FLow. cervical ROM WFL, shld ROM WFL. Strength shld flexion R 4-/5, L 4+/5. Neck Disability Index score 21/50. Discharge. Reason for Discharge  Completion of Prescribed visits    Comments:   Thank you for this referral      Aaron Lenz  Date: 2021

## 2021-06-21 ENCOUNTER — HOSPITAL ENCOUNTER (EMERGENCY)
Age: 46
Discharge: HOME | End: 2021-06-21
Attending: FAMILY MEDICINE
Payer: COMMERCIAL

## 2021-06-21 VITALS
HEIGHT: 75 IN | HEART RATE: 64 BPM | OXYGEN SATURATION: 98 % | TEMPERATURE: 98 F | RESPIRATION RATE: 20 BRPM | SYSTOLIC BLOOD PRESSURE: 137 MMHG | BODY MASS INDEX: 23.75 KG/M2 | DIASTOLIC BLOOD PRESSURE: 87 MMHG | WEIGHT: 191 LBS

## 2021-06-21 DIAGNOSIS — H10.022 OTHER MUCOPURULENT CONJUNCTIVITIS OF LEFT EYE: ICD-10-CM

## 2021-06-21 DIAGNOSIS — H16.133 UV KERATITIS, BILATERAL: Primary | ICD-10-CM

## 2021-06-21 PROCEDURE — 99284 EMERGENCY DEPT VISIT MOD MDM: CPT

## 2021-06-21 PROCEDURE — 87070 CULTURE OTHR SPECIMN AEROBIC: CPT

## 2021-06-21 PROCEDURE — 6370000000 HC RX 637 (ALT 250 FOR IP): Performed by: FAMILY MEDICINE

## 2021-06-21 PROCEDURE — 87205 SMEAR GRAM STAIN: CPT

## 2021-06-21 RX ORDER — PURIFIED WATER 986 MG/ML
1 SOLUTION OPHTHALMIC PRN
Status: DISCONTINUED | OUTPATIENT
Start: 2021-06-21 | End: 2021-06-21 | Stop reason: HOSPADM

## 2021-06-21 RX ORDER — TOBRAMYCIN AND DEXAMETHASONE 3; 1 MG/ML; MG/ML
2 SUSPENSION/ DROPS OPHTHALMIC ONCE
Status: COMPLETED | OUTPATIENT
Start: 2021-06-21 | End: 2021-06-21

## 2021-06-21 RX ORDER — AZITHROMYCIN 250 MG/1
TABLET, FILM COATED ORAL
Qty: 1 PACKET | Refills: 0 | Status: SHIPPED | OUTPATIENT
Start: 2021-06-21 | End: 2021-06-25

## 2021-06-21 RX ORDER — AZITHROMYCIN 250 MG/1
500 TABLET, FILM COATED ORAL ONCE
Status: COMPLETED | OUTPATIENT
Start: 2021-06-21 | End: 2021-06-21

## 2021-06-21 RX ORDER — TETRACAINE HYDROCHLORIDE 5 MG/ML
1 SOLUTION OPHTHALMIC ONCE
Status: COMPLETED | OUTPATIENT
Start: 2021-06-21 | End: 2021-06-21

## 2021-06-21 RX ADMIN — TETRACAINE HYDROCHLORIDE 1 DROP: 5 SOLUTION OPHTHALMIC at 01:35

## 2021-06-21 RX ADMIN — PURIFIED WATER 1 DROP: 986 SOLUTION OPHTHALMIC at 02:01

## 2021-06-21 RX ADMIN — TOBRAMYCIN AND DEXAMETHASONE 2 DROP: 3; 1 SUSPENSION/ DROPS OPHTHALMIC at 02:58

## 2021-06-21 RX ADMIN — AZITHROMYCIN MONOHYDRATE 500 MG: 250 TABLET ORAL at 02:57

## 2021-06-21 ASSESSMENT — PAIN SCALES - GENERAL: PAINLEVEL_OUTOF10: 10

## 2021-06-21 ASSESSMENT — PAIN DESCRIPTION - LOCATION: LOCATION: EYE

## 2021-06-21 ASSESSMENT — PAIN DESCRIPTION - DESCRIPTORS: DESCRIPTORS: BURNING

## 2021-06-21 ASSESSMENT — PAIN DESCRIPTION - FREQUENCY: FREQUENCY: CONTINUOUS

## 2021-06-21 ASSESSMENT — PAIN DESCRIPTION - ORIENTATION: ORIENTATION: LEFT;RIGHT

## 2021-06-21 NOTE — ED PROVIDER NOTES
eMERGENCY dEPARTMENT eNCOUnter        CHIEF COMPLAINT    Chief Complaint   Patient presents with    Eye Pain     Pt states was working in the garage earlier today and noticed that something got in his eyes. \"Maybe dirt, nothing liquid. \" Shahbazlindsay Caldwell at home with no relief. Pain and discomfort is getting worst.        HPI    Kyra Burris is a 50 y.o. male who presents with bilateral eye pain from working in the garage earlier today. He was grinding metal.  May be dirt or something liquid got in his eyes. He tried Visine at home but this did not help the pain or photosensitivity. Blurriness of vision. He does not wear contacts. No recent febrile illness. No head injury. No chemical splash to the face or eyes. Upon later questioning the patient admits to seeing optometrist in the last 2 weeks. He had some right eye irritation that she attended to. 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 REVIEW OF SYSTEMS    HENT: No sore throat. Some rhinorrhea probably as result of excessive tearing. No recent facial rash or cold sores. GI: No abdominal pain or vomiting. : No dysuria or flank pain. No  infection. Resp: No shortness of breath or cough. CV: No chest pain or palpitations. All other systems reviewed and otherwise negative. PAST MEDICAL HISTORY    Past Medical History:   Diagnosis Date    COPD (chronic obstructive pulmonary disease) ([address]     Former smoker     H/O hiatal hernia     Hypertension     PUD (peptic ulcer disease) 2/17/2014       SURGICAL HISTORY    Past Surgical History:   Procedure Laterality Date    CARDIAC CATHETERIZATION  3/6/14    Dr Danielle Dyson: 1. Normal left ventricular systolic function, estimated ejectin fraction of 50 to 52%. 2. Normal left ventricular end-diastolic pressure.  3. Normal angiographically appearing epicardial coronary artery disease, incidental note is made of a small myocardial bridging segment within the left anterior descending and distal anterior circulation vessel attenuation.  CHOLECYSTECTOMY      DENTAL SURGERY      ENDOSCOPY, COLON, DIAGNOSTIC   & 7/20/15    HIATAL HERNIA REPAIR  2016    UPPER GASTROINTESTINAL ENDOSCOPY  2014    Dr. Abel Cormier ENDOSCOPY  10/09/14    mild gastritis and duodenitis, Random biopsies of GE junction    UPPER GASTROINTESTINAL ENDOSCOPY  2015       CURRENT MEDICATIONS        ALLERGIES    Allergies   Allergen Reactions    Bee Venom Anaphylaxis    Codeine Shortness Of Breath       FAMILY HISTORY    Family History   Problem Relation Age of Onset    Diabetes Mother     Asthma Mother     Cancer Father         Cancer    Heart Disease Father        SOCIAL HISTORY    Social History     Socioeconomic History    Marital status:      Spouse name: None    Number of children: None    Years of education: None    Highest education level: None   Occupational History    None   Tobacco Use    Smoking status: Former Smoker     Years: 15.00     Quit date: 3/24/2009     Years since quittin.2    Smokeless tobacco: Current User     Types: Chew   Substance and Sexual Activity    Alcohol use: Yes     Alcohol/week: 1.0 standard drinks     Types: 1 Cans of beer per week     Comment: rare    Drug use: No    Sexual activity: None   Other Topics Concern    None   Social History Narrative    None     Social Determinants of Health     Financial Resource Strain:     Difficulty of Paying Living Expenses:    Food Insecurity:     Worried About Running Out of Food in the Last Year:     Ran Out of Food in the Last Year:    Transportation Needs:     Lack of Transportation (Medical):      Lack of Transportation (Non-Medical):    Physical Activity:     Days of Exercise per Week:     Minutes of Exercise per Session:    Stress:     Feeling of Stress :    Social Connections:     Frequency of Communication with Friends and Family:     Frequency of Social Gatherings with Friends and Family:     Attends Jain Services:     Active Member of Clubs or Organizations:     Attends Club or Organization Meetings:     Marital Status:    Intimate Partner Violence:     Fear of Current or Ex-Partner:     Emotionally Abused:     Physically Abused:     Sexually Abused:        PHYSICAL EXAM    VITAL SIGNS: /87   Pulse 64   Temp 98 °F (36.7 °C) (Oral)   Resp 20   Ht 6' 3\" (1.905 m)   Wt 191 lb (86.6 kg)   SpO2 98%   BMI 23.87 kg/m²   Constitutional:  Well developed, well nourished, 69-year-old male with bilateral eye pain, no acute distress, non-toxic appearance   Eyes: Bilateral eye pain with photophobia. Eyelid movement appropriate. PERRLA at 3 mm. The eyes are injected and there is corneal irritation. No foreign bodies noted. Eyelids were everted. Bilateral eye pressures 8-9. Good red reflex bilaterally. VA good to printed word and object ID. HENT:  Atraumatic, external ears normal, nose normal, oropharynx moist, no pharyngeal exudates. Neck- supple   Respiratory:  No respiratory distress, normal breath sounds   Cardiovascular:  Normal rate, normal rhythm, no murmurs   Integument:  Well hydrated and no facial or neck rash. Neurologic: Alert and oriented. No focal deficits noted       ED COURSE & MEDICAL DECISION MAKING    Pertinent Labs & Imaging studies reviewed. (See chart for details)    Summation      Patient Course: 69-year-old male with bilateral eye pain. Working on a  and doing some welding have contributed to his conjunctivitis and keratitis. Photosensitive. Eye irrigation and Eye-Stream drops are used. Tetracaine for pain control. Mucopurulence is noted especially left eye with culture taken. Cool compresses to the eyes. Left eye is initially patched after initial treatment. TobraDex used initially but not continued. Zithromax prescription pending eye culture.   Treatment of keratitis and eye problem are discussed with the patient. Discussed follow-up with ophthalmologist.    ED Medications administered this visit:    Medications   eye stream ophthalmic solution 1 drop (1 drop Both Eyes Given 6/21/21 0201)   tetracaine (TETRAVISC) 0.5 % ophthalmic solution 1 drop (1 drop Both Eyes Given 6/21/21 0135)   azithromycin (ZITHROMAX) tablet 500 mg (500 mg Oral Given 6/21/21 0257)   tobramycin-dexamethasone (TOBRADEX) ophthalmic suspension 2 drop (2 drops Left Eye Given 6/21/21 0258)       New Prescriptions from this visit:    Discharge Medication List as of 6/21/2021  3:36 AM      START taking these medications    Details   azithromycin (ZITHROMAX Z-LUCIA) 250 MG tablet Take 2 tablets (500 mg) on Day 1, and then take 1 tablet (250 mg) on days 2 through 5., Disp-1 packet, R-0Normal             Follow-up:  Bryanna Currie MD  [address]  [phone]    Call today  If symptoms worsen        Final Impression:   1. UV keratitis, bilateral    2.  Other mucopurulent conjunctivitis of left eye               (Please note that portions of this note were completed with a voice recognition program.  Efforts were made to edit the dictations but occasionally words are mis-transcribed.)          Rivka Jonas DO  06/22/21 [id number]

## 2021-06-22 LAB
CULTURE: ABNORMAL
DIRECT EXAM: ABNORMAL
DIRECT EXAM: ABNORMAL
Lab: ABNORMAL
SPECIMEN DESCRIPTION: ABNORMAL

## 2021-07-30 ENCOUNTER — HOSPITAL ENCOUNTER (OUTPATIENT)
Dept: MRI IMAGING | Age: 46
Discharge: HOME | End: 2021-08-01
Payer: COMMERCIAL

## 2021-07-30 DIAGNOSIS — S06.0X1A CONCUSSION WITH LOSS OF CONSCIOUSNESS OF 30 MINUTES OR LESS, INITIAL ENCOUNTER: ICD-10-CM

## 2021-07-30 PROCEDURE — A9577 INJ MULTIHANCE: HCPCS | Performed by: PSYCHIATRY & NEUROLOGY

## 2021-07-30 PROCEDURE — 6360000004 HC RX CONTRAST MEDICATION: Performed by: PSYCHIATRY & NEUROLOGY

## 2021-07-30 PROCEDURE — 70553 MRI BRAIN STEM W/O & W/DYE: CPT

## 2021-07-30 RX ADMIN — GADOBENATE DIMEGLUMINE 18 ML: 529 INJECTION, SOLUTION INTRAVENOUS at 12:13

## 2022-06-23 ENCOUNTER — APPOINTMENT (OUTPATIENT)
Dept: GENERAL RADIOLOGY | Age: 47
End: 2022-06-23
Payer: COMMERCIAL

## 2022-06-23 ENCOUNTER — HOSPITAL ENCOUNTER (EMERGENCY)
Age: 47
Discharge: HOME | End: 2022-06-23
Attending: EMERGENCY MEDICINE
Payer: COMMERCIAL

## 2022-06-23 VITALS
DIASTOLIC BLOOD PRESSURE: 94 MMHG | WEIGHT: 194.2 LBS | SYSTOLIC BLOOD PRESSURE: 136 MMHG | RESPIRATION RATE: 15 BRPM | BODY MASS INDEX: 24.27 KG/M2 | OXYGEN SATURATION: 96 % | HEART RATE: 60 BPM | TEMPERATURE: 99 F

## 2022-06-23 DIAGNOSIS — R07.89 ATYPICAL CHEST PAIN: Primary | ICD-10-CM

## 2022-06-23 LAB
ABSOLUTE EOS #: 0.1 K/UL (ref 0–0.4)
ABSOLUTE LYMPH #: 1.4 K/UL (ref 1–4.8)
ABSOLUTE MONO #: 0.5 K/UL (ref 0–1)
ALBUMIN SERPL-MCNC: 4.3 G/DL (ref 3.5–5.2)
ALP BLD-CCNC: 106 U/L (ref 40–129)
ALT SERPL-CCNC: 23 U/L (ref 5–41)
ANION GAP SERPL CALCULATED.3IONS-SCNC: 14 MMOL/L (ref 9–17)
AST SERPL-CCNC: 21 U/L
BASOPHILS # BLD: 0 % (ref 0–2)
BASOPHILS ABSOLUTE: 0 K/UL (ref 0–0.2)
BILIRUB SERPL-MCNC: 0.26 MG/DL (ref 0.3–1.2)
BUN BLDV-MCNC: 14 MG/DL (ref 6–20)
CALCIUM SERPL-MCNC: 9.3 MG/DL (ref 8.6–10.4)
CHLORIDE BLD-SCNC: 101 MMOL/L (ref 98–107)
CO2: 25 MMOL/L (ref 20–31)
CREAT SERPL-MCNC: 1.08 MG/DL (ref 0.7–1.2)
DIFFERENTIAL TYPE: YES
EKG ATRIAL RATE: 74 BPM
EKG P AXIS: 71 DEGREES
EKG P-R INTERVAL: 148 MS
EKG Q-T INTERVAL: 422 MS
EKG QRS DURATION: 112 MS
EKG QTC CALCULATION (BAZETT): 468 MS
EKG R AXIS: -25 DEGREES
EKG T AXIS: 55 DEGREES
EKG VENTRICULAR RATE: 74 BPM
EOSINOPHILS RELATIVE PERCENT: 2 % (ref 0–5)
GFR AFRICAN AMERICAN: >60 ML/MIN
GFR NON-AFRICAN AMERICAN: >60 ML/MIN
GFR SERPL CREATININE-BSD FRML MDRD: ABNORMAL ML/MIN/{1.73_M2}
GLUCOSE BLD-MCNC: 124 MG/DL (ref 70–99)
HCT VFR BLD CALC: 40.9 % (ref 41–53)
HEMOGLOBIN: 13.8 G/DL (ref 13.5–17.5)
LYMPHOCYTES # BLD: 24 % (ref 13–44)
MAGNESIUM: 2 MG/DL (ref 1.6–2.6)
MCH RBC QN AUTO: 29.5 PG (ref 26–34)
MCHC RBC AUTO-ENTMCNC: 33.7 G/DL (ref 31–37)
MCV RBC AUTO: 87.4 FL (ref 80–100)
MONOCYTES # BLD: 9 % (ref 5–9)
PDW BLD-RTO: 13.9 % (ref 12.1–15.2)
PLATELET # BLD: 240 K/UL (ref 140–450)
POTASSIUM SERPL-SCNC: 3.2 MMOL/L (ref 3.7–5.3)
RBC # BLD: 4.68 M/UL (ref 4.5–5.9)
SEG NEUTROPHILS: 65 % (ref 39–75)
SEGMENTED NEUTROPHILS ABSOLUTE COUNT: 3.9 K/UL (ref 2.1–6.5)
SODIUM BLD-SCNC: 140 MMOL/L (ref 135–144)
TOTAL PROTEIN: 7.3 G/DL (ref 6.4–8.3)
TROPONIN, HIGH SENSITIVITY: <6 NG/L (ref 0–22)
TROPONIN, HIGH SENSITIVITY: <6 NG/L (ref 0–22)
WBC # BLD: 6 K/UL (ref 3.5–11)

## 2022-06-23 PROCEDURE — 85025 COMPLETE CBC W/AUTO DIFF WBC: CPT

## 2022-06-23 PROCEDURE — 96375 TX/PRO/DX INJ NEW DRUG ADDON: CPT

## 2022-06-23 PROCEDURE — 96374 THER/PROPH/DIAG INJ IV PUSH: CPT

## 2022-06-23 PROCEDURE — 71045 X-RAY EXAM CHEST 1 VIEW: CPT

## 2022-06-23 PROCEDURE — 83735 ASSAY OF MAGNESIUM: CPT

## 2022-06-23 PROCEDURE — 93010 ELECTROCARDIOGRAM REPORT: CPT | Performed by: INTERNAL MEDICINE

## 2022-06-23 PROCEDURE — 6360000002 HC RX W HCPCS: Performed by: EMERGENCY MEDICINE

## 2022-06-23 PROCEDURE — 2580000003 HC RX 258: Performed by: EMERGENCY MEDICINE

## 2022-06-23 PROCEDURE — 80053 COMPREHEN METABOLIC PANEL: CPT

## 2022-06-23 PROCEDURE — 99285 EMERGENCY DEPT VISIT HI MDM: CPT

## 2022-06-23 PROCEDURE — 6370000000 HC RX 637 (ALT 250 FOR IP): Performed by: EMERGENCY MEDICINE

## 2022-06-23 PROCEDURE — 93005 ELECTROCARDIOGRAM TRACING: CPT | Performed by: EMERGENCY MEDICINE

## 2022-06-23 PROCEDURE — 84484 ASSAY OF TROPONIN QUANT: CPT

## 2022-06-23 PROCEDURE — 36415 COLL VENOUS BLD VENIPUNCTURE: CPT

## 2022-06-23 RX ORDER — 0.9 % SODIUM CHLORIDE 0.9 %
500 INTRAVENOUS SOLUTION INTRAVENOUS ONCE
Status: COMPLETED | OUTPATIENT
Start: 2022-06-23 | End: 2022-06-23

## 2022-06-23 RX ORDER — POTASSIUM CHLORIDE 750 MG/1
TABLET, FILM COATED, EXTENDED RELEASE ORAL
Status: DISCONTINUED
Start: 2022-06-23 | End: 2022-06-23 | Stop reason: HOSPADM

## 2022-06-23 RX ORDER — ASPIRIN 81 MG/1
324 TABLET, CHEWABLE ORAL ONCE
Status: COMPLETED | OUTPATIENT
Start: 2022-06-23 | End: 2022-06-23

## 2022-06-23 RX ORDER — KETOROLAC TROMETHAMINE 15 MG/ML
15 INJECTION, SOLUTION INTRAMUSCULAR; INTRAVENOUS ONCE
Status: COMPLETED | OUTPATIENT
Start: 2022-06-23 | End: 2022-06-23

## 2022-06-23 RX ORDER — KETOROLAC TROMETHAMINE 10 MG/1
10 TABLET, FILM COATED ORAL EVERY 8 HOURS PRN
Qty: 15 TABLET | Refills: 0 | Status: SHIPPED | OUTPATIENT
Start: 2022-06-23

## 2022-06-23 RX ORDER — POTASSIUM CHLORIDE 750 MG/1
40 TABLET, FILM COATED, EXTENDED RELEASE ORAL ONCE
Status: COMPLETED | OUTPATIENT
Start: 2022-06-23 | End: 2022-06-23

## 2022-06-23 RX ORDER — ORPHENADRINE CITRATE 30 MG/ML
60 INJECTION INTRAMUSCULAR; INTRAVENOUS ONCE
Status: COMPLETED | OUTPATIENT
Start: 2022-06-23 | End: 2022-06-23

## 2022-06-23 RX ADMIN — KETOROLAC TROMETHAMINE 15 MG: 15 INJECTION, SOLUTION INTRAMUSCULAR; INTRAVENOUS at 00:39

## 2022-06-23 RX ADMIN — POTASSIUM CHLORIDE 40 MEQ: 750 TABLET, FILM COATED, EXTENDED RELEASE ORAL at 01:18

## 2022-06-23 RX ADMIN — ORPHENADRINE CITRATE 60 MG: 60 INJECTION INTRAMUSCULAR; INTRAVENOUS at 01:19

## 2022-06-23 RX ADMIN — ASPIRIN 81 MG 324 MG: 81 TABLET ORAL at 00:40

## 2022-06-23 RX ADMIN — SODIUM CHLORIDE 500 ML: 9 INJECTION, SOLUTION INTRAVENOUS at 00:41

## 2022-06-23 ASSESSMENT — PAIN DESCRIPTION - DESCRIPTORS: DESCRIPTORS: ACHING;STABBING

## 2022-06-23 ASSESSMENT — PAIN - FUNCTIONAL ASSESSMENT: PAIN_FUNCTIONAL_ASSESSMENT: 0-10

## 2022-06-23 ASSESSMENT — PAIN DESCRIPTION - LOCATION
LOCATION: CHEST
LOCATION: CHEST

## 2022-06-23 ASSESSMENT — PAIN SCALES - GENERAL
PAINLEVEL_OUTOF10: 7

## 2022-06-23 NOTE — ED PROVIDER NOTES
HPI:  6/23/22,   Time: 12:35 AM EDT         Kayy Morgan is a 48 y.o. male presenting to the ED for gradual onset of moderate chest pain he describes as a sharp sensation, beginning 20 minutes ago. The complaint has been constant, moderate in severity, and worsened by changing position. No fever chills night sweats and no diaphoresis or nausea. Patient states its been a few years since his last stress test    ROS:   Pertinent positives and negatives are stated within HPI, all other systems reviewed and are negative.  --------------------------------------------- PAST HISTORY ---------------------------------------------  Past Medical History:  has a past medical history of COPD (chronic obstructive pulmonary disease) ([facility] [address] Former smoker, H/O hiatal hernia, Hypertension, and PUD (peptic ulcer disease). Past Surgical History:  has a past surgical history that includes Cholecystectomy; Upper gastrointestinal endoscopy (03-); Cardiac catheterization (3/6/14); Upper gastrointestinal endoscopy (10/09/14); Dental surgery; Endoscopy, colon, diagnostic (2014 & 7/20/15); Upper gastrointestinal endoscopy (07-); and hiatal hernia repair (06/09/2016). Social History:  reports that he quit smoking about 13 years ago. He quit after 15.00 years of use. His smokeless tobacco use includes chew. He reports current alcohol use of about 1.0 standard drink of alcohol per week. He reports that he does not use drugs. Family History: family history includes Asthma in his mother; Cancer in his father; Diabetes in his mother; Heart Disease in his father. The patients home medications have been reviewed.     Allergies: Bee venom and Codeine    -------------------------------------------------- RESULTS -------------------------------------------------  All laboratory and radiology results have been personally reviewed by myself   LABS:  Results for orders placed or performed during the hospital encounter of 06/23/22   CBC with Auto Differential   Result Value Ref Range    WBC 6.0 3.5 - 11.0 k/uL    RBC 4.68 4.5 - 5.9 m/uL    Hemoglobin 13.8 13.5 - 17.5 g/dL    Hematocrit 40.9 (L) 41 - 53 %    MCV 87.4 80 - 100 fL    MCH 29.5 26 - 34 pg    MCHC 33.7 31 - 37 g/dL    RDW 13.9 12.1 - 15.2 %    Platelets 259 642 - 808 k/uL    Differential Type YES     Seg Neutrophils 65 39 - 75 %    Lymphocytes 24 13 - 44 %    Monocytes 9 5 - 9 %    Eosinophils % 2 0 - 5 %    Basophils 0 0 - 2 %    Segs Absolute 3.90 2.1 - 6.5 k/uL    Absolute Lymph # 1.40 1.0 - 4.8 k/uL    Absolute Mono # 0.50 0.0 - 1.0 k/uL    Absolute Eos # 0.10 0.0 - 0.4 k/uL    Basophils Absolute 0.00 0.0 - 0.2 k/uL   Comprehensive Metabolic Panel w/ Reflex to MG   Result Value Ref Range    Glucose 124 (H) 70 - 99 mg/dL    BUN 14 6 - 20 mg/dL    CREATININE 1.08 0.70 - 1.20 mg/dL    Calcium 9.3 8.6 - 10.4 mg/dL    Sodium 140 135 - 144 mmol/L    Potassium 3.2 (L) 3.7 - 5.3 mmol/L    Chloride 101 98 - 107 mmol/L    CO2 25 20 - 31 mmol/L    Anion Gap 14 9 - 17 mmol/L    Alkaline Phosphatase 106 40 - 129 U/L    ALT 23 5 - 41 U/L    AST 21 <40 U/L    Total Bilirubin 0.26 (L) 0.30 - 1.20 mg/dL    Total Protein 7.3 6.4 - 8.3 g/dL    Albumin 4.3 3.5 - 5.2 g/dL    GFR Non-African American >60 >60 mL/min    GFR African American >60 >60 mL/min    GFR Comment         Troponin   Result Value Ref Range    Troponin, High Sensitivity <6 0 - 22 ng/L       RADIOLOGY:  Interpreted by Radiologist.  XR CHEST PORTABLE   Final Result      No radiographic evidence for acute chest abnormality.                      ------------------------- NURSING NOTES AND VITALS REVIEWED ---------------------------   The nursing notes within the ED encounter and vital signs as below have been reviewed.    BP (!) 146/91   Pulse 61   Temp 99 °F (37.2 °C) (Oral)   Resp 21   Wt 194 lb 3.2 oz (88.1 kg)   SpO2 97%   BMI 24.27 kg/m²   Oxygen Saturation Interpretation: Normal      ---------------------------------------------------PHYSICAL EXAM--------------------------------------      Constitutional/General: Alert and oriented x3, well appearing, non toxic in moderate apparent distress from pain  Head: NC/AT  Eyes: PERRL, EOMI  Mouth: Oropharynx clear, handling secretions, no trismus  Neck: Supple, full ROM, no meningeal signs  Pulmonary: Lungs clear to auscultation bilaterally, no wheezes, rales, or rhonchi. Not in respiratory distress  Cardiovascular:  Regular rate and rhythm, no murmurs, gallops, or rubs. 2+ distal pulses  Abdomen: Soft, non tender, non distended,   Extremities: Moves all extremities x 4. Warm and well perfused  Skin: warm and dry without rash  Neurologic: GCS 15,  Psych: Closes eyes during questioning  Chest wall: Reproducible left-sided peristernal tenderness to palpation      ------------------------------ ED COURSE/MEDICAL DECISION MAKING----------------------  Medications   0.9 % sodium chloride bolus (500 mLs IntraVENous New Bag 6/23/22 0041)   potassium chloride (KLOR-CON M) extended release tablet 40 mEq (has no administration in time range)   orphenadrine (NORFLEX) injection 60 mg (has no administration in time range)   aspirin chewable tablet 324 mg (324 mg Oral Given 6/23/22 0040)   ketorolac (TORADOL) injection 15 mg (15 mg IntraVENous Given 6/23/22 0039)         Medical Decision Making:    Chest pain rule out ACS versus musculoskeletal, troponin less than 6 and EKG unremarkable and pain reproducible therefore unlikely cardiac    EKG normal sinus rhythm with a rate of 74 bpm, right bundle branch block, no clear acute ischemic change or ectopy    Counseling: The emergency provider has spoken with the patient and discussed todays results, in addition to providing specific details for the plan of care and counseling regarding the diagnosis and prognosis.   Questions are answered at this time and they are agreeable with the plan.      --------------------------------- IMPRESSION AND DISPOSITION ---------------------------------    IMPRESSION  1.  Atypical chest pain Stable       DISPOSITION  Disposition: Discharge to home  Patient condition is stable                  Marleen Sawyer MD  06/23/22 6037

## 2022-06-23 NOTE — LETTER
[facility] ED  [address] [facility] [address]  Phone: [phone]               June 23, 2022    Patient: Nimesh Reyes   YOB: 1975   Date of Visit: 6/23/2022       To Whom It May Concern:    Loree Brooks was seen and treated in our emergency department on 6/23/2022. He may return to work on 06/27/2022.       Sincerely,       Lester Estrella RN         Signature:__________________________________

## 2022-10-24 ENCOUNTER — HOSPITAL ENCOUNTER (EMERGENCY)
Age: 47
Discharge: HOME | End: 2022-10-24
Attending: FAMILY MEDICINE
Payer: MEDICAID

## 2022-10-24 VITALS
RESPIRATION RATE: 16 BRPM | TEMPERATURE: 98 F | OXYGEN SATURATION: 98 % | SYSTOLIC BLOOD PRESSURE: 132 MMHG | DIASTOLIC BLOOD PRESSURE: 73 MMHG | HEART RATE: 80 BPM

## 2022-10-24 DIAGNOSIS — T63.441A ALLERGIC REACTION TO BEE STING: Primary | ICD-10-CM

## 2022-10-24 PROCEDURE — 96374 THER/PROPH/DIAG INJ IV PUSH: CPT

## 2022-10-24 PROCEDURE — 2500000003 HC RX 250 WO HCPCS: Performed by: FAMILY MEDICINE

## 2022-10-24 PROCEDURE — A4216 STERILE WATER/SALINE, 10 ML: HCPCS | Performed by: FAMILY MEDICINE

## 2022-10-24 PROCEDURE — 6360000002 HC RX W HCPCS: Performed by: FAMILY MEDICINE

## 2022-10-24 PROCEDURE — 96372 THER/PROPH/DIAG INJ SC/IM: CPT

## 2022-10-24 PROCEDURE — 2580000003 HC RX 258: Performed by: FAMILY MEDICINE

## 2022-10-24 PROCEDURE — 96375 TX/PRO/DX INJ NEW DRUG ADDON: CPT

## 2022-10-24 PROCEDURE — 99284 EMERGENCY DEPT VISIT MOD MDM: CPT

## 2022-10-24 RX ORDER — PREDNISONE 20 MG/1
60 TABLET ORAL DAILY
Qty: 12 TABLET | Refills: 0 | Status: SHIPPED | OUTPATIENT
Start: 2022-10-24 | End: 2022-10-28

## 2022-10-24 RX ORDER — METHYLPREDNISOLONE SODIUM SUCCINATE 125 MG/2ML
125 INJECTION, POWDER, LYOPHILIZED, FOR SOLUTION INTRAMUSCULAR; INTRAVENOUS ONCE
Status: COMPLETED | OUTPATIENT
Start: 2022-10-24 | End: 2022-10-24

## 2022-10-24 RX ORDER — DIPHENHYDRAMINE HYDROCHLORIDE 50 MG/ML
25 INJECTION INTRAMUSCULAR; INTRAVENOUS ONCE
Status: COMPLETED | OUTPATIENT
Start: 2022-10-24 | End: 2022-10-24

## 2022-10-24 RX ORDER — EPINEPHRINE 0.3 MG/.3ML
0.3 INJECTION SUBCUTANEOUS ONCE
Qty: 0.3 ML | Refills: 1 | Status: SHIPPED | OUTPATIENT
Start: 2022-10-24 | End: 2022-10-24

## 2022-10-24 RX ADMIN — EPINEPHRINE 0.5 MG: 1 INJECTION INTRAMUSCULAR; INTRAVENOUS; SUBCUTANEOUS at 14:47

## 2022-10-24 RX ADMIN — DIPHENHYDRAMINE HYDROCHLORIDE 25 MG: 50 INJECTION, SOLUTION INTRAMUSCULAR; INTRAVENOUS at 14:47

## 2022-10-24 RX ADMIN — FAMOTIDINE 20 MG: 10 INJECTION, SOLUTION INTRAVENOUS at 14:48

## 2022-10-24 RX ADMIN — METHYLPREDNISOLONE SODIUM SUCCINATE 125 MG: 125 INJECTION, POWDER, FOR SOLUTION INTRAMUSCULAR; INTRAVENOUS at 14:47

## 2022-10-24 ASSESSMENT — PAIN SCALES - GENERAL: PAINLEVEL_OUTOF10: 4

## 2022-10-24 ASSESSMENT — LIFESTYLE VARIABLES
HOW OFTEN DO YOU HAVE A DRINK CONTAINING ALCOHOL: NEVER
HOW MANY STANDARD DRINKS CONTAINING ALCOHOL DO YOU HAVE ON A TYPICAL DAY: PATIENT DOES NOT DRINK

## 2022-10-24 ASSESSMENT — PAIN - FUNCTIONAL ASSESSMENT: PAIN_FUNCTIONAL_ASSESSMENT: 0-10

## 2022-10-24 NOTE — LETTER
[facility] ED  [address]  Phone: [phone]               October 24, 2022    Patient: Reji Cuevas   YOB: 1975   Date of Visit: 10/24/2022       To Whom It May Concern:    Alesha Barry was seen and treated in our emergency department on 10/24/2022. He may return to work 10/25/2022.       Sincerely,       Nasima Jackson RN         Signature:__________________________________

## 2022-10-25 NOTE — ED PROVIDER NOTES
[facility]  eMERGENCY dEPARTMENT eNCOUnter          [address] [facility]       Chief Complaint   Patient presents with    Insect Bite     3 stings, top of head, left elbow, right lat       Nurses Notes reviewed and I agree except as noted in the HPI. HISTORY OF PRESENT ILLNESS    Edouard Lorenz is a 50 y.o. male who presents to the emergency room via private vehicle, patient states he was stung by 3 bee like insects 15 minutes prior to arrival, has had significant allergies with them in the past, states he was bit on the top of the head left elbow and right lateral lower chest wall, states that have some scratchiness and discomfort with swallowing, feels examined some slight rash developing, denies any marked swelling of his lips or tongue. Patient does not have an EpiPen due to cost.    REVIEW OF SYSTEMS     Review of Systems   All other systems reviewed and are negative. PAST MEDICAL HISTORY    has a past medical history of COPD (chronic obstructive pulmonary disease) ([facility] [address] Former smoker, H/O hiatal hernia, Hypertension, and PUD (peptic ulcer disease). SURGICAL HISTORY      has a past surgical history that includes Cholecystectomy; Upper gastrointestinal endoscopy (2014); Cardiac catheterization (3/6/14); Upper gastrointestinal endoscopy (10/09/14); Dental surgery; Endoscopy, colon, diagnostic ( & 7/20/15); Upper gastrointestinal endoscopy (2015); and hiatal hernia repair (2016). CURRENT MEDICATIONS       Discharge Medication List as of 10/24/2022  4:08 PM        CONTINUE these medications which have NOT CHANGED    Details   ketorolac (TORADOL) 10 MG tablet Take 1 tablet by mouth every 8 hours as needed for Pain, Disp-15 tablet, R-0Normal             ALLERGIES     is allergic to bee venom and codeine. FAMILY HISTORY     He indicated that his mother is alive. He indicated that his father is . He indicated that his sister is alive.  He indicated that his brother is alive. He indicated that his maternal grandmother is . He indicated that his maternal grandfather is . He indicated that his paternal grandmother is . He indicated that his paternal grandfather is . He indicated that his daughter is alive. He indicated that all of his three sons are alive. family history includes Asthma in his mother; Cancer in his father; Diabetes in his mother; Heart Disease in his father. SOCIAL HISTORY      reports that he quit smoking about 13 years ago. His smokeless tobacco use includes chew. He reports current alcohol use of about 1.0 standard drink per week. He reports that he does not use drugs. PHYSICAL EXAM     INITIAL VITALS:  oral temperature is 98 °F (36.7 °C). His blood pressure is 132/73 and his pulse is 80. His respiration is 16 and oxygen saturation is 98%. Physical Exam   Constitutional: Patient is oriented to person, place, and time. Patient appears well-developed and well-nourished. Patient is active and cooperative. HENT:   Head: Normocephalic and atraumatic. Head is without contusion. Right Ear: Hearing and external ear normal. No drainage. Left Ear: Hearing and external ear normal. No drainage. Nose: Nose normal. No nasal deformity. No epistaxis. Mouth/Throat: Mucous membranes are not dry. Negative oral lesions or exudate  Eyes: EOMI. Conjunctivae, sclera, and lids are normal. Right eye exhibits no discharge. Left eye exhibits no discharge. Neck: Full passive range of motion without pain and phonation normal.   Cardiovascular:  Normal rate, regular rhythm and intact distal pulses. Pulses: Right radial pulse  2+   Pulmonary/Chest: Effort normal. No tachypnea and no bradypnea. No wheezes, rhonchi, or rales. Abdominal: Soft.  Patient without distension or tenderness  Musculoskeletal:   Negative acute trauma or deformity,  apparent full range of motion and normal strength all extremities appropriate to age. Neurological: Patient is alert and oriented to person, place, and time. patient displays no tremor. Patient displays no seizure activity. .  Skin: Skin is warm and dry. Patient is not diaphoretic. Noted areas of erythema measuring ovoid 2 to 3 x 7 to 10 cm, on the left parasagittal portion of scalp just superior to the crown, left elbow, right lateral chest wall overlying lower ribs, no observed stinger remaining  Psychiatric: Patient has a normal mood and affect. Patient speech is normal and behavior is normal. Cognition and memory are normal.     DIFFERENTIAL DIAGNOSIS:   Allergic/anaphylactic reaction    DIAGNOSTIC RESULTS         RADIOLOGY: non-plain film images(s) such as CT, Ultrasound and MRI are read by the radiologist.  No orders to display       LABS:   Labs Reviewed - No data to display    EMERGENCY DEPARTMENT COURSE:   Vitals:    Vitals:    10/24/22 1435 10/24/22 1506 10/24/22 1626 10/24/22 1627   BP: 138/89  132/73    Pulse: 80      Resp: 16      Temp: 98 °F (36.7 °C)      TempSrc: Oral      SpO2: 97% 96% 97% 98%     Patient history and physical exam taken at bedside, discussed patient symptoms and exam findings, given patient scratchiness of the throat, will give epinephrine IM, get IV access and give IV diphenhydramine IV famotidine IV Solu-Medrol and observe patient emergency room, patient sitting semi-Garcia's in bed, acknowledged    Patient was rechecked a few times with the emergency room, slowly getting better, no longer having any issues with his throat, including good pulse oximetry throughout    Discussed the patient discharge at this time, will start patient on a few day course of steroids, keep Benadryl with him at all times, will write prescription for EpiPen and did get a good Rx coupon to help with cost, outpatient follow-up with PCP, return to ER if any symptoms change or further concerns, acknowledged        FINAL IMPRESSION      1.  Allergic reaction to bee sting DISPOSITION/PLAN   D/c    PATIENT REFERRED TO:  David Redding  [address] [id number] [phone]  Call       [facility] ED  [address] [address]  [phone]    As needed, If symptoms worsen      DISCHARGE MEDICATIONS:  Discharge Medication List as of 10/24/2022  4:08 PM        START taking these medications    Details   EPINEPHrine (EPIPEN 2-LUCIA) 0.3 MG/0.3ML SOAJ injection Inject 0.3 mLs into the muscle once for 1 dose Use as directed for allergic reaction, Disp-0.3 mL, R-1Normal      predniSONE (DELTASONE) 20 MG tablet Take 3 tablets by mouth daily for 4 days, Disp-12 tablet, R-0Normal                 Summation      Patient Course:  d/c    ED Medications administered this visit:    Medications   EPINEPHrine 1 MG/ML injection 0.5 mg (0.5 mg IntraMUSCular Given 10/24/22 1447)   diphenhydrAMINE (BENADRYL) injection 25 mg (25 mg IntraVENous Given 10/24/22 1447)   famotidine (PEPCID) 20 mg in sodium chloride (PF) 10 mL injection (20 mg IntraVENous Given 10/24/22 1448)   methylPREDNISolone sodium (SOLU-MEDROL) injection 125 mg (125 mg IntraVENous Given 10/24/22 1447)       New Prescriptions from this visit:    Discharge Medication List as of 10/24/2022  4:08 PM        START taking these medications    Details   EPINEPHrine (EPIPEN 2-LUCIA) 0.3 MG/0.3ML SOAJ injection Inject 0.3 mLs into the muscle once for 1 dose Use as directed for allergic reaction, Disp-0.3 mL, R-1Normal      predniSONE (DELTASONE) 20 MG tablet Take 3 tablets by mouth daily for 4 days, Disp-12 tablet, R-0Normal             Follow-up:  David Redding  [address] [address] [phone]  Call       [facility] ED  [address] [address] [phone]    As needed, If symptoms worsen        Final Impression:   1.  Allergic reaction to bee sting               (Please note that portions of this note were completed with a voice recognition program.  Efforts were made to edit the dictations but occasionally words are mis-transcribed.)    MD Dakota Morton MD  10/25/22 6954

## 2024-01-25 ENCOUNTER — HOSPITAL ENCOUNTER (EMERGENCY)
Age: 49
Discharge: HOME | End: 2024-01-25
Attending: EMERGENCY MEDICINE
Payer: MEDICAID

## 2024-01-25 VITALS
HEIGHT: 75 IN | SYSTOLIC BLOOD PRESSURE: 152 MMHG | DIASTOLIC BLOOD PRESSURE: 92 MMHG | RESPIRATION RATE: 18 BRPM | TEMPERATURE: 98.1 F | WEIGHT: 223 LBS | BODY MASS INDEX: 27.73 KG/M2 | HEART RATE: 82 BPM | OXYGEN SATURATION: 97 %

## 2024-01-25 DIAGNOSIS — M02.361 REACTIVE ARTHRITIS OF RIGHT KNEE (HCC): Primary | ICD-10-CM

## 2024-01-25 PROCEDURE — 99283 EMERGENCY DEPT VISIT LOW MDM: CPT

## 2024-01-25 PROCEDURE — 6370000000 HC RX 637 (ALT 250 FOR IP): Performed by: EMERGENCY MEDICINE

## 2024-01-25 RX ORDER — NAPROXEN 250 MG/1
500 TABLET ORAL ONCE
Status: COMPLETED | OUTPATIENT
Start: 2024-01-25 | End: 2024-01-25

## 2024-01-25 RX ORDER — NAPROXEN 375 MG/1
375 TABLET ORAL 2 TIMES DAILY PRN
Qty: 180 TABLET | Refills: 1 | Status: SHIPPED | OUTPATIENT
Start: 2024-01-25

## 2024-01-25 RX ORDER — PREDNISONE 20 MG/1
40 TABLET ORAL ONCE
Status: COMPLETED | OUTPATIENT
Start: 2024-01-25 | End: 2024-01-25

## 2024-01-25 RX ORDER — PREDNISONE 50 MG/1
50 TABLET ORAL DAILY
Qty: 5 TABLET | Refills: 0 | Status: SHIPPED | OUTPATIENT
Start: 2024-01-25 | End: 2024-01-30

## 2024-01-25 RX ADMIN — PREDNISONE 40 MG: 20 TABLET ORAL at 21:25

## 2024-01-25 RX ADMIN — NAPROXEN 500 MG: 250 TABLET ORAL at 21:25

## 2024-01-25 ASSESSMENT — ENCOUNTER SYMPTOMS
CHEST TIGHTNESS: 0
BACK PAIN: 1
ABDOMINAL DISTENTION: 0

## 2024-01-25 ASSESSMENT — PAIN - FUNCTIONAL ASSESSMENT
PAIN_FUNCTIONAL_ASSESSMENT: 0-10
PAIN_FUNCTIONAL_ASSESSMENT: PREVENTS OR INTERFERES SOME ACTIVE ACTIVITIES AND ADLS

## 2024-01-25 ASSESSMENT — PAIN DESCRIPTION - ORIENTATION: ORIENTATION: RIGHT

## 2024-01-25 ASSESSMENT — PAIN DESCRIPTION - LOCATION: LOCATION: KNEE

## 2024-01-25 ASSESSMENT — PAIN SCALES - GENERAL: PAINLEVEL_OUTOF10: 5

## 2024-01-25 ASSESSMENT — PAIN DESCRIPTION - DESCRIPTORS
DESCRIPTORS: TIGHTNESS;SORE
DESCRIPTORS: ACHING;TIGHTNESS;THROBBING

## 2024-01-26 NOTE — ED PROVIDER NOTES
noted below, none     Procedures    FINAL IMPRESSION      1. Reactive arthritis of right knee (HCC)          DISPOSITION/PLAN     DISPOSITION Decision To Discharge 01/25/2024 09:23:54 PM      PATIENT REFERRED TO:  [facility] ED  [address] [address]  [phone]  In 1 week  As needed      DISCHARGE MEDICATIONS:  New Prescriptions    NAPROXEN (NAPROSYN) 375 MG TABLET    Take 1 tablet by mouth 2 times daily as needed for Pain    PREDNISONE (DELTASONE) 50 MG TABLET    Take 1 tablet by mouth daily for 5 days       (Please note that portions of this note were completed with a voice recognition program.  Efforts were made to edit the dictations but occasionally words are mis-transcribed.)    Ananda Gipson MD (electronically signed)  Attending Emergency Physician            Ananda Gipson MD  01/25/24 0982